# Patient Record
Sex: FEMALE | Race: BLACK OR AFRICAN AMERICAN | Employment: OTHER | URBAN - METROPOLITAN AREA
[De-identification: names, ages, dates, MRNs, and addresses within clinical notes are randomized per-mention and may not be internally consistent; named-entity substitution may affect disease eponyms.]

---

## 2018-03-09 ENCOUNTER — OFFICE VISIT (OUTPATIENT)
Dept: SURGERY | Age: 63
End: 2018-03-09

## 2018-03-09 VITALS
HEIGHT: 63 IN | TEMPERATURE: 97.7 F | RESPIRATION RATE: 20 BRPM | SYSTOLIC BLOOD PRESSURE: 141 MMHG | BODY MASS INDEX: 46.78 KG/M2 | HEART RATE: 96 BPM | DIASTOLIC BLOOD PRESSURE: 79 MMHG | WEIGHT: 264 LBS

## 2018-03-09 DIAGNOSIS — K21.9 GASTROESOPHAGEAL REFLUX DISEASE WITHOUT ESOPHAGITIS: ICD-10-CM

## 2018-03-09 DIAGNOSIS — I10 ESSENTIAL HYPERTENSION: ICD-10-CM

## 2018-03-09 DIAGNOSIS — E66.01 MORBID OBESITY (HCC): Primary | ICD-10-CM

## 2018-03-09 DIAGNOSIS — N39.3 SUI (STRESS URINARY INCONTINENCE, FEMALE): ICD-10-CM

## 2018-03-09 PROBLEM — E11.9 TYPE 2 DIABETES MELLITUS WITHOUT COMPLICATION, WITHOUT LONG-TERM CURRENT USE OF INSULIN (HCC): Status: ACTIVE | Noted: 2018-03-09

## 2018-03-09 RX ORDER — MELOXICAM 15 MG/1
15 TABLET ORAL DAILY
Status: ON HOLD | COMMUNITY
End: 2018-07-26

## 2018-03-09 RX ORDER — RANITIDINE 300 MG/1
300 CAPSULE ORAL DAILY
COMMUNITY
End: 2018-07-26

## 2018-03-09 RX ORDER — DULOXETIN HYDROCHLORIDE 60 MG/1
60 CAPSULE, DELAYED RELEASE ORAL DAILY
COMMUNITY
End: 2018-09-19 | Stop reason: SDUPTHER

## 2018-03-09 NOTE — PROGRESS NOTES
Initial Consultation for Bariatric Surgery Template (Gastric Bypass)    Valentina Manning is a 65 y.o. female who comes into the office today for initial consultation for the surgical options for the treatment of morbid obesity. The patient initially identified obesity at the age of 49 and at age 26 weighed 125lbs. She has never tried a weight-loss attempt. Today, the patient is  Height: 5' 3\" (160 cm) tall, Weight: 119.7 kg (264 lb) lbs for a Body mass index is 46.77 kg/(m^2). It is due to the patient's severe obesity, which is further complicated by adult onset diabetes mellitus, hypertension, obstructive sleep apnea - CPAP and weight related arthopathies  that the patient is now seeking out bariatric surgery, specifically, gastric bypass. Past Medical History:   Diagnosis Date    Carpal tunnel syndrome     Colon polyp     Depression     Diabetes (HCC)     Fibromyalgia     Hiatal hernia     Hypertension     Irregular heart rate     ARRYTHMIA    Osteoarthritis (arthritis due to wear and tear of joints)     PTSD (post-traumatic stress disorder)     Sleep apnea     Spinal stenosis        Past Surgical History:   Procedure Laterality Date    COLONOSCOPY N/A 2/21/2017    COLONOSCOPY performed by Libia Bowens MD at [address] [place] [address] HX ABDOMINAL LAPAROSCOPY      CYST/tumors    HX APPENDECTOMY  1997    HX HYSTERECTOMY  1992    total    HX SALPINGO-OOPHORECTOMY  1997    BILATERAL       Current Outpatient Prescriptions   Medication Sig Dispense Refill    atorvastatin (LIPITOR) 40 mg tablet Take 40 mg by mouth nightly.  diclofenac EC (VOLTAREN) 75 mg EC tablet Take 75 mg by mouth two (2) times a day.  metoprolol (LOPRESSOR) 50 mg tablet Take  by mouth two (2) times a day.  sitaGLIPtin (JANUVIA) 100 mg tablet Take 100 mg by mouth nightly.  traMADol (ULTRAM) 50 mg tablet Take 1 Tab by mouth every six (6) hours as needed for Pain.  10 Tab 0    sertraline (ZOLOFT) 50 mg tablet Take  by mouth daily.          Allergies   Allergen Reactions    Aspirin Shortness of Breath    Demerol [Meperidine] Shortness of Breath    Doxycycline Shortness of Breath    Morphine Shortness of Breath    Neurontin [Gabapentin] Unable to Obtain    Nortriptyline Other (comments)     hallucinations    Tetracyclines Shortness of Breath    Tylenol [Acetaminophen] Shortness of Breath       Social History   Substance Use Topics    Smoking status: Never Smoker    Smokeless tobacco: Never Used    Alcohol use None       Family History   Problem Relation Age of Onset    Ovarian Cancer Mother 64    Ovarian Cancer Maternal Grandmother     Cancer Father      Prostate Ca       Family Status   Relation Status    Mother     Maternal Grandmother     Father     Sister Alive    X3    Brother Alive    X3    Brother    Akil Thompson Sister     Maternal Grandfather     Paternal Grandfather     Paternal Grandmother        Review of Systems:  Positive in BOLD    CONST: Fever, weight loss, fatigue or chills  GI: Nausea, vomiting, abdominal pain, change in bowel habits, hematochezia, melena, and GERD   INTEG: Dermatitis, abnormal moles  HEENT: Recent changes in vision, vertigo, epistaxis, dysphagia and hoarseness  CV: Chest pain, palpitations, HTN, edema and varicosities  RESP: Cough, shortness of breath, wheezing, hemoptysis, snoring and reactive airway disease, she does not wear CPAP -   : Hematuria, dysuria, frequency, urgency, nocturia and stress urinary incontinence   MS: Weakness, joint pain and arthritis  ENDO: Diabetes, thyroid disease, polyuria, polydipsia, polyphagia, poor wound healing, heat intolerance, cold intolerance  LYMPH/HEME: Anemia, bruising and history of blood transfusions  NEURO: Dizziness, headache, fainting, seizures and stroke  PSYCH: Anxiety and depression      Physical Exam    Visit Vitals    /79 (BP 1 Location: Left arm, BP Patient Position: At rest)    Pulse 96    Temp 97.7 °F (36.5 °C) (Oral)    Resp 20    Ht 5' 3\" (1.6 m)    Wt 119.7 kg (264 lb)    BMI 46.77 kg/m2       Pre op weight: 264  EBW: 133  Wt loss to date: 0       General: 65 y.o.) female in no acute distress. Morbidly obese in abdomen - mixed pattern  HEENT: Normocephalic, atraumatic, Pupils equal and reactive, nasopharynx clear, oropharynx clear and moist without lesions  NECK: Supple, no lymphadenopathy, thyromegaly, carotid bruits or jugular venous distension. trachea midline  RESP: Clear to auscultation bilaterally, no wheezes, rhonchi, or rales, normal respiratory excursion  CV: Regular rate and rhythm, no murmurs, rubs or gallops. 3+/4 pulses in bilateral dorsalis pedis and posterior tibialis. No distal edema or varicosities. ABD: Soft, nontender, nondistended, normoactive bowel sounds, no hernias, no hepatosplenomegaly, easily palpable costal margins, mixed distribution, umbilical hernia, healed lower midline scar. Extremities: Warm, well perfused, no tenderness or swelling, normal gait/station  Neuro: Sensation and strength grossly intact and symmetrical  Psych: Alert and oriented to person, place, and time. Impression:    Everardo Olivares is a 65 y.o. female who is suffering from morbid obesity with a BMI of 47  and comorbidities including adult onset diabetes mellitus, hypertension, obstructive sleep apnea - CPAP and weight related arthopathies  who would benefit from bariatric surgery. We have had an extensive discussion with regard to the risks, benefits and likely outcomes of the operation. We've discussed the restrictive and malabsorptive nature of the gastric bypass and compared and contrasted with the sleeve gastrectomy. The patient understands the likelihood of losing approximately 80% of their excess weight in 12 to 18 months.  She also understands the risks including but not limited to bleeding, infection, need for reoperation, ulcers, leaks and strictures, bowel obstruction secondary to adhesions and internal hernias, DVT, PE, heart attack, stroke, and death. Patient also understands risks of inadequate weight loss, excess weight loss, vitamin insufficiency, protein malnutrition, excess skin, and loss of hair. We have reviewed the components of a successful postoperative course including requirement for a high protein, low carbohydrate diet, 60 oz a day of zero calorie liquids, daily vitamin supplementation, daily exercise, regular follow-up, and participation in support groups. At this time we will enroll the patient in our bariatric program, undertake routine laboratory evaluation, chest X-ray, EKG, possible UGI and evaluation by  nutritionist as well as psychologist and pending their satisfactory completion of the preop evaluation, plan to perform a sleeve assuming she does not have a large hiatal hernia. This is because she cannot tolerate Tylenol or most narcotics and she has chronic pain. As a result she uses NSAIDs to control her pain often. If she has a large HH, she will need to return to discuss options further. She must also wear CPAP in preparation for her surgery.

## 2018-03-09 NOTE — PROGRESS NOTES
Juan Cherry is a 65 y.o. female who presents today with   Chief Complaint   Patient presents with    Morbid Obesity     Consult            Body mass index is 46.77 kg/(m^2). 1. Have you been to the ER, urgent care clinic since your last visit? Hospitalized since your last visit? No    2. Have you seen or consulted any other health care providers outside of the [address] [place] [address] since your last visit? Include any pap smears or colon screening.  No

## 2018-03-09 NOTE — LETTER
3/9/2018 1:44 PM 
 
Patient:  Eb Banda YOB: 1955 Date of Visit: 3/9/2018 Casey Feliz MD 
[address] [address] VIA Facsimile: [phone] Dear Casey Feliz MD, Thank you for referring Ms. Porsha Mcmahon to [address] [address] for evaluation and treatment. Below are the relevant portions of my assessment and plan of care. Initial Consultation for Bariatric Surgery Template (Gastric Bypass) Eb Banda is a 65 y.o. female who comes into the office today for initial consultation for the surgical options for the treatment of morbid obesity. The patient initially identified obesity at the age of 49 and at age 26 weighed 125lbs. She has never tried a weight-loss attempt. Today, the patient is  Height: 5' 3\" (160 cm) tall, Weight: 119.7 kg (264 lb) lbs for a Body mass index is 46.77 kg/(m^2). It is due to the patient's severe obesity, which is further complicated by adult onset diabetes mellitus, hypertension, obstructive sleep apnea - CPAP and weight related arthopathies  that the patient is now seeking out bariatric surgery, specifically, gastric bypass. Past Medical History:  
Diagnosis Date  Carpal tunnel syndrome  Colon polyp  Depression  Diabetes ([address]  Fibromyalgia  Hiatal hernia  Hypertension  Irregular heart rate ARRYTHMIA  Osteoarthritis (arthritis due to wear and tear of joints)  PTSD (post-traumatic stress disorder)  Sleep apnea  Spinal stenosis Past Surgical History:  
Procedure Laterality Date  COLONOSCOPY N/A 2/21/2017 COLONOSCOPY performed by Elizabet Zhang MD at [address] [place] [address] HX ABDOMINAL LAPAROSCOPY    
 CYST/tumors [address] [place] [address]  HX HYSTERECTOMY  1992  
 total  
 HX SALPINGO-OOPHORECTOMY  1997 BILATERAL Current Outpatient Prescriptions Medication Sig Dispense Refill  atorvastatin (LIPITOR) 40 mg tablet Take 40 mg by mouth nightly.  diclofenac EC (VOLTAREN) 75 mg EC tablet Take 75 mg by mouth two (2) times a day.  metoprolol (LOPRESSOR) 50 mg tablet Take  by mouth two (2) times a day.  sitaGLIPtin (JANUVIA) 100 mg tablet Take 100 mg by mouth nightly.  traMADol (ULTRAM) 50 mg tablet Take 1 Tab by mouth every six (6) hours as needed for Pain. 10 Tab 0  
 sertraline (ZOLOFT) 50 mg tablet Take  by mouth daily. Allergies Allergen Reactions  Aspirin Shortness of Breath  Demerol [Meperidine] Shortness of Breath  Doxycycline Shortness of Breath  Morphine Shortness of Breath  Neurontin [Gabapentin] Unable to [employer]  Nortriptyline Other (comments)  
  hallucinations  Tetracyclines Shortness of Breath  Tylenol [Acetaminophen] Shortness of Breath Social History Substance Use Topics  Smoking status: Never Smoker  Smokeless tobacco: Never Used  Alcohol use None Family History Problem Relation Age of Onset  Ovarian Cancer Mother 64  
 Ovarian Cancer Maternal Grandmother  Cancer Father Prostate Ca Family Status Relation Status  Mother   Maternal Grandmother   Father   Sister Alive X3  Brother Alive X3  Brother   Sister   Maternal Grandfather   Paternal Grandfather   Paternal Grandmother  Review of Systems:  Positive in BOLD 
 
CONST: Fever, weight loss, fatigue or chills GI: Nausea, vomiting, abdominal pain, change in bowel habits, hematochezia, melena, and GERD INTEG: Dermatitis, abnormal moles HEENT: Recent changes in vision, vertigo, epistaxis, dysphagia and hoarseness CV: Chest pain, palpitations, HTN, edema and varicosities RESP: Cough, shortness of breath, wheezing, hemoptysis, snoring and reactive airway disease, she does not wear CPAP -  
 : Hematuria, dysuria, frequency, urgency, nocturia and stress urinary incontinence MS: Weakness, joint pain and arthritis ENDO: Diabetes, thyroid disease, polyuria, polydipsia, polyphagia, poor wound healing, heat intolerance, cold intolerance LYMPH/HEME: Anemia, bruising and history of blood transfusions NEURO: Dizziness, headache, fainting, seizures and stroke PSYCH: Anxiety and depression Physical Exam 
 
Visit Vitals  /79 (BP 1 Location: Left arm, BP Patient Position: At rest)  Pulse 96  Temp 97.7 °F (36.5 °C) (Oral)  Resp 20  
 Ht 5' 3\" (1.6 m)  Wt 119.7 kg (264 lb)  BMI 46.77 kg/m2 Pre op weight: 264 EBW: 133 Wt loss to date: 0 General: 65 y.o.) female in no acute distress. Morbidly obese in abdomen - mixed pattern HEENT: Normocephalic, atraumatic, Pupils equal and reactive, nasopharynx clear, oropharynx clear and moist without lesions NECK: Supple, no lymphadenopathy, thyromegaly, carotid bruits or jugular venous distension. trachea midline RESP: Clear to auscultation bilaterally, no wheezes, rhonchi, or rales, normal respiratory excursion CV: Regular rate and rhythm, no murmurs, rubs or gallops. 3+/4 pulses in bilateral dorsalis pedis and posterior tibialis. No distal edema or varicosities. ABD: Soft, nontender, nondistended, normoactive bowel sounds, no hernias, no hepatosplenomegaly, easily palpable costal margins, mixed distribution, umbilical hernia, healed lower midline scar. Extremities: Warm, well perfused, no tenderness or swelling, normal gait/station Neuro: Sensation and strength grossly intact and symmetrical 
Psych: Alert and oriented to person, place, and time.  
 
Impression: 
 
Skylar Valencia is a 65 y.o. female who is suffering from morbid obesity with a BMI of 47  and comorbidities including adult onset diabetes mellitus, hypertension, obstructive sleep apnea - CPAP and weight related arthopathies  who would benefit from bariatric surgery. We have had an extensive discussion with regard to the risks, benefits and likely outcomes of the operation. We've discussed the restrictive and malabsorptive nature of the gastric bypass and compared and contrasted with the sleeve gastrectomy. The patient understands the likelihood of losing approximately 80% of their excess weight in 12 to 18 months. She also understands the risks including but not limited to bleeding, infection, need for reoperation, ulcers, leaks and strictures, bowel obstruction secondary to adhesions and internal hernias, DVT, PE, heart attack, stroke, and death. Patient also understands risks of inadequate weight loss, excess weight loss, vitamin insufficiency, protein malnutrition, excess skin, and loss of hair. We have reviewed the components of a successful postoperative course including requirement for a high protein, low carbohydrate diet, 60 oz a day of zero calorie liquids, daily vitamin supplementation, daily exercise, regular follow-up, and participation in support groups. At this time we will enroll the patient in our bariatric program, undertake routine laboratory evaluation, chest X-ray, EKG, possible UGI and evaluation by  nutritionist as well as psychologist and pending their satisfactory completion of the preop evaluation, plan to perform a sleeve assuming she does not have a large hiatal hernia. This is because she cannot tolerate Tylenol or most narcotics and she has chronic pain. As a result she uses NSAIDs to control her pain often. If she has a large HH, she will need to return to discuss options further. She must also wear CPAP in preparation for her surgery. Thank you very much for your referral of Ms. Khalida Jones. If you have questions, please do not hesitate to call me. I look forward to following MsRadha Anisha along with you and will keep you updated as to her progress.   
 
 
 
 
Sincerely, 
 
 
 Sheila Dean MD

## 2018-03-09 NOTE — MR AVS SNAPSHOT
[address] [place] [address] [address] [address] 
[phone] Patient: Osvaldo Tran MRN: [id number] Visit Information Date & Time Provider Department Dept. Phone Encounter #  
 3/9/2018  1:00 PM MD [facility] [phone] [id number] Your Appointments 4/12/2018 10:30 AM  
NUTRITION COUNSELING with [facility] DIETICIAN [address] ([place]) Appt Note: 1 of 6 LGBP  
 [address] [place] [address] [address]  
  
   
 [address] [place] [address] [address] [facility] 5/10/2018 10:30 AM  
NUTRITION COUNSELING with [facility] DIETICIAN [address] ([place]) Appt Note: 2 of 6 BP  
 [address] [place] [address] [address] [address]  
[phone] 6/14/2018 10:30 AM  
NUTRITION COUNSELING with [facility] DIETICIAN [address] ([place]) Appt Note: 3 of [address] [facility]  
[phone]  
  
    
 6/29/2018 10:45 AM  
Follow Up with Andreas Devries MD  
[address] [place]) Appt Note: mid trail apt  
 [address] [place] [address] [address] [facility] 7/12/2018 10:30 AM  
NUTRITION COUNSELING with [facility] DIETICIAN [address] ([place]) Appt Note: 4 of 6  
 [address] [place] [address] [address] [address]  
[phone]  
  
    
 8/16/2018 10:30 AM  
NUTRITION COUNSELING with [facility] DIETICIAN [address] ([place]) Appt Note: 5 of 6 [address] [address]  
[phone] 9/13/2018 10:30 AM  
NUTRITION COUNSELING with [facility] DIETICIAN [address] ([facility]) Appt Note: 6 of [address] [place] [address] [phone] Upcoming Health Maintenance Date Due Hepatitis C Screening 1955 DTaP/Tdap/Td series (1 - Tdap) 3/25/1977 PAP AKA CERVICAL CYTOLOGY 3/25/1977 FOBT Q 1 YEAR AGE 50-75 3/25/2006 ZOSTER VACCINE AGE 60> 1/25/2016 BREAST CANCER SCRN MAMMOGRAM 2/19/2017 Influenza Age 5 to Adult 8/1/2017 Allergies as of 3/9/2018  Review Complete On: 3/9/2018 By: Hoang Valadez Severity Noted Reaction Type Reactions Aspirin  07/28/2014    Shortness of Breath Demerol [Meperidine]  07/28/2014    Shortness of Breath Doxycycline  07/28/2014    Shortness of Breath Morphine  07/28/2014    Shortness of Breath Neurontin [Gabapentin]  07/28/2014    Unable to Obtain Nortriptyline  01/14/2016    Other (comments)  
 hallucinations Tetracyclines  07/28/2014    Shortness of Breath Tylenol [Acetaminophen]  07/28/2014    Shortness of Breath Current Immunizations  Never Reviewed No immunizations on file. Not reviewed this visit You Were Diagnosed With   
  
 Codes Comments Morbid obesity ([facility] [address]    -  Primary ICD-10-CM: E66.01 
ICD-9-CM: 278.01 Body mass index (BMI) of 40.0-44.9 in adult [facility])     ICD-10-CM: Z68.41 
ICD-9-CM: V85.41 Essential hypertension     ICD-10-CM: I10 
ICD-9-CM: 401.9 Gastroesophageal reflux disease without esophagitis     ICD-10-CM: K21.9 ICD-9-CM: 530.81 DONTE (stress urinary incontinence, female)     ICD-10-CM: N39.3 ICD-9-CM: 625.6 Vitals BP Pulse Temp Resp Height(growth percentile) Weight(growth percentile) 141/79 (BP 1 Location: Left arm, BP Patient Position: At rest) 96 97.7 °F (36.5 °C) (Oral) 20 5' 3\" (1.6 m) 264 lb (119.7 kg) BMI OB Status Smoking Status 46.77 kg/m2 Hysterectomy Never Smoker Vitals History BMI and BSA Data Body Mass Index Body Surface Area  
 46.77 kg/m 2 2.31 m 2 Your Updated Medication List  
  
   
This list is accurate as of 3/9/18  2:00 PM.  Always use your most recent med list.  
  
  
  
  
 atorvastatin 40 mg tablet Commonly known as:  LIPITOR Take 40 mg by mouth nightly. diclofenac EC 75 mg EC tablet Commonly known as:  VOLTAREN Take 75 mg by mouth two (2) times a day. DULoxetine 60 mg capsule Commonly known as:  CYMBALTA Take 60 mg by mouth daily. meloxicam 15 mg tablet Commonly known as:  MOBIC Take 15 mg by mouth daily. metoprolol tartrate 50 mg tablet Commonly known as:  LOPRESSOR Take  by mouth two (2) times a day. raNITIdine hcl 300 mg Cap Take 300 Caps by mouth daily. Indications: Heartburn SITagliptin 100 mg tablet Commonly known as:  [place] Take 100 mg by mouth nightly. traMADol 50 mg tablet Commonly known as:  ULTRAM  
Take 1 Tab by mouth every six (6) hours as needed for Pain. To-Do List   
 03/09/2018 Lab:  CBC WITH AUTOMATED DIFF   
  
 03/09/2018 ECG:  EKG, 12 LEAD, INITIAL   
  
 03/09/2018 Lab:  FERRITIN   
  
 03/09/2018 Lab:  HEMOGLOBIN A1C WITH EAG   
  
 03/09/2018 Lab:  IRON   
  
 03/09/2018 Lab:  LIPID PANEL   
  
 03/09/2018 Lab:  METABOLIC PANEL, COMPREHENSIVE   
  
 03/09/2018 Lab:  TSH 3RD GENERATION   
  
 03/09/2018 Lab:  VITAMIN B1, WHOLE BLOOD   
  
 03/09/2018 Lab:  VITAMIN B12 & FOLATE   
  
 03/09/2018 Lab:  VITAMIN D, 25 HYDROXY   
  
 03/09/2018 Imaging:  XR UPPER GI SERIES W KUB Introducing [facility]! [facility] introduces [employer] patient portal. Now you can access parts of your medical record, email your doctor's office, and request medication refills online.    
 
1. In your internet browser, go to [email or web address] 2. Click on the First Time User? Click Here link in the Sign In box. You will see the New Member Sign Up page. 3. Enter your Mapbar Access Code exactly as it appears below. You will not need to use this code after youve completed the sign-up process. If you do not sign up before the expiration date, you must request a new code. · Mapbar Access Code: [email or web address] Expires: 6/7/2018 12:46 PM 
 
4. Enter the last four digits of your Social Security Number (xxxx) and Date of Birth (mm/dd/yyyy) as indicated and click Submit. You will be taken to the next sign-up page. 5. Create a [employer] ID. This will be your Mapbar login ID and cannot be changed, so think of one that is secure and easy to remember. 6. Create a Mapbar password. You can change your password at any time. 7. Enter your Password Reset Question and Answer. This can be used at a later time if you forget your password. 8. Enter your e-mail address. You will receive e-mail notification when new information is available in [address] 9. Click Sign Up. You can now view and download portions of your medical record. 10. Click the Download Summary menu link to download a portable copy of your medical information. If you have questions, please visit the Frequently Asked Questions section of the Mapbar website. Remember, Mapbar is NOT to be used for urgent needs. For medical emergencies, dial 911. Now available from your iPhone and Android! Please provide this summary of care documentation to your next provider. Your primary care clinician is listed as Melanie Moscoso. If you have any questions after today's visit, please call [phone].

## 2018-03-09 NOTE — COMMUNICATION BODY
Initial Consultation for Bariatric Surgery Template (Gastric Bypass)    Juan Cherry is a 65 y.o. female who comes into the office today for initial consultation for the surgical options for the treatment of morbid obesity. The patient initially identified obesity at the age of 49 and at age 26 weighed 125lbs. She has never tried a weight-loss attempt. Today, the patient is  Height: 5' 3\" (160 cm) tall, Weight: 119.7 kg (264 lb) lbs for a Body mass index is 46.77 kg/(m^2). It is due to the patient's severe obesity, which is further complicated by adult onset diabetes mellitus, hypertension, obstructive sleep apnea - CPAP and weight related arthopathies  that the patient is now seeking out bariatric surgery, specifically, gastric bypass. Past Medical History:   Diagnosis Date    Carpal tunnel syndrome     Colon polyp     Depression     Diabetes (HCC)     Fibromyalgia     Hiatal hernia     Hypertension     Irregular heart rate     ARRYTHMIA    Osteoarthritis (arthritis due to wear and tear of joints)     PTSD (post-traumatic stress disorder)     Sleep apnea     Spinal stenosis        Past Surgical History:   Procedure Laterality Date    COLONOSCOPY N/A 2/21/2017    COLONOSCOPY performed by Davon Salomon MD at [address] [place] [address] HX ABDOMINAL LAPAROSCOPY      CYST/tumors    HX APPENDECTOMY  1997    HX HYSTERECTOMY  1992    total    HX SALPINGO-OOPHORECTOMY  1997    BILATERAL       Current Outpatient Prescriptions   Medication Sig Dispense Refill    atorvastatin (LIPITOR) 40 mg tablet Take 40 mg by mouth nightly.  diclofenac EC (VOLTAREN) 75 mg EC tablet Take 75 mg by mouth two (2) times a day.  metoprolol (LOPRESSOR) 50 mg tablet Take  by mouth two (2) times a day.  sitaGLIPtin (JANUVIA) 100 mg tablet Take 100 mg by mouth nightly.  traMADol (ULTRAM) 50 mg tablet Take 1 Tab by mouth every six (6) hours as needed for Pain.  10 Tab 0    sertraline (ZOLOFT) 50 mg tablet Take  by mouth daily.          Allergies   Allergen Reactions    Aspirin Shortness of Breath    Demerol [Meperidine] Shortness of Breath    Doxycycline Shortness of Breath    Morphine Shortness of Breath    Neurontin [Gabapentin] Unable to Obtain    Nortriptyline Other (comments)     hallucinations    Tetracyclines Shortness of Breath    Tylenol [Acetaminophen] Shortness of Breath       Social History   Substance Use Topics    Smoking status: Never Smoker    Smokeless tobacco: Never Used    Alcohol use None       Family History   Problem Relation Age of Onset    Ovarian Cancer Mother 64    Ovarian Cancer Maternal Grandmother     Cancer Father      Prostate Ca       Family Status   Relation Status    Mother     Maternal Grandmother     Father     Sister Alive    X3    Brother Alive    X3    Brother    [facility] Sister     Maternal Grandfather     Paternal Grandfather     Paternal Grandmother        Review of Systems:  Positive in BOLD    CONST: Fever, weight loss, fatigue or chills  GI: Nausea, vomiting, abdominal pain, change in bowel habits, hematochezia, melena, and GERD   INTEG: Dermatitis, abnormal moles  HEENT: Recent changes in vision, vertigo, epistaxis, dysphagia and hoarseness  CV: Chest pain, palpitations, HTN, edema and varicosities  RESP: Cough, shortness of breath, wheezing, hemoptysis, snoring and reactive airway disease, she does not wear CPAP -   : Hematuria, dysuria, frequency, urgency, nocturia and stress urinary incontinence   MS: Weakness, joint pain and arthritis  ENDO: Diabetes, thyroid disease, polyuria, polydipsia, polyphagia, poor wound healing, heat intolerance, cold intolerance  LYMPH/HEME: Anemia, bruising and history of blood transfusions  NEURO: Dizziness, headache, fainting, seizures and stroke  PSYCH: Anxiety and depression      Physical Exam    Visit Vitals    /79 (BP 1 Location: Left arm, BP Patient Position: At rest)    Pulse 96    Temp 97.7 °F (36.5 °C) (Oral)    Resp 20    Ht 5' 3\" (1.6 m)    Wt 119.7 kg (264 lb)    BMI 46.77 kg/m2       Pre op weight: 264  EBW: 133  Wt loss to date: 0       General: 65 y.o.) female in no acute distress. Morbidly obese in abdomen - mixed pattern  HEENT: Normocephalic, atraumatic, Pupils equal and reactive, nasopharynx clear, oropharynx clear and moist without lesions  NECK: Supple, no lymphadenopathy, thyromegaly, carotid bruits or jugular venous distension. trachea midline  RESP: Clear to auscultation bilaterally, no wheezes, rhonchi, or rales, normal respiratory excursion  CV: Regular rate and rhythm, no murmurs, rubs or gallops. 3+/4 pulses in bilateral dorsalis pedis and posterior tibialis. No distal edema or varicosities. ABD: Soft, nontender, nondistended, normoactive bowel sounds, no hernias, no hepatosplenomegaly, easily palpable costal margins, mixed distribution, umbilical hernia, healed lower midline scar. Extremities: Warm, well perfused, no tenderness or swelling, normal gait/station  Neuro: Sensation and strength grossly intact and symmetrical  Psych: Alert and oriented to person, place, and time. Impression:    Reina Barber is a 65 y.o. female who is suffering from morbid obesity with a BMI of 47  and comorbidities including adult onset diabetes mellitus, hypertension, obstructive sleep apnea - CPAP and weight related arthopathies  who would benefit from bariatric surgery. We have had an extensive discussion with regard to the risks, benefits and likely outcomes of the operation. We've discussed the restrictive and malabsorptive nature of the gastric bypass and compared and contrasted with the sleeve gastrectomy. The patient understands the likelihood of losing approximately 80% of their excess weight in 12 to 18 months.  She also understands the risks including but not limited to bleeding, infection, need for reoperation, ulcers, leaks and strictures, bowel obstruction secondary to adhesions and internal hernias, DVT, PE, heart attack, stroke, and death. Patient also understands risks of inadequate weight loss, excess weight loss, vitamin insufficiency, protein malnutrition, excess skin, and loss of hair. We have reviewed the components of a successful postoperative course including requirement for a high protein, low carbohydrate diet, 60 oz a day of zero calorie liquids, daily vitamin supplementation, daily exercise, regular follow-up, and participation in support groups. At this time we will enroll the patient in our bariatric program, undertake routine laboratory evaluation, chest X-ray, EKG, possible UGI and evaluation by  nutritionist as well as psychologist and pending their satisfactory completion of the preop evaluation, plan to perform a sleeve assuming she does not have a large hiatal hernia. This is because she cannot tolerate Tylenol or most narcotics and she has chronic pain. As a result she uses NSAIDs to control her pain often. If she has a large HH, she will need to return to discuss options further. She must also wear CPAP in preparation for her surgery.

## 2018-03-12 LAB
UREA BREATH TEST QL: NEGATIVE
UREA BREATH TEST QL: NORMAL

## 2018-04-30 ENCOUNTER — DOCUMENTATION ONLY (OUTPATIENT)
Dept: SURGERY | Age: 63
End: 2018-04-30

## 2018-04-30 NOTE — PROGRESS NOTES
[facility]  [address] [facility], [address] [place]    Patient's Name: Essence Lancaster                                  Age: 59 y.o. YOB: 1955                                          Sex: female  Date: 04/19/2018  Insurance: [employer]                         Session: 1 of 6               Surgeon:  Dr. Omer Wharton    Height: 5'3\"                    Weight: 255#           Starting weight with surgeon: 264#          Do you smoke?: no    Alcohol Intake:   I do not drink at all:x      Class Guidelines    Pt. Understand that if they miss a month, depending on insurance, they may have to start over. Pt. Also understand that the expectation is to lose  Or maintain weight. Weight gain may result in delaying surgery until the weight is off. EATING HABITS AND BEHAVIORS:  Pt. Struggles With:  Liquid calories:   Skipping breakfast:   Eating foods with a high amount of carbohydrates:   Eating High fat foods:   Eating large portions:   Making poor snack choices:  Eating out frequently:   Skipping meals: x  Reading labels:   Drinking >48 oz fluids daily:   Getting sufficient physical activity/exercise: x  Night time eating/snacking:   Binge eating:   Eating often, even when not hungry (grazing):   Giving into peer pressure regarding eating/drinking:   Other:     Each class we spend time discussing the pre-op diet, diet progression and vitamin/mineral requirements as well as the Key Diet Principles. Each class we also cover lowering carbohydrate consumption. Keeping carbohydrates <25 grams per meal and avoiding added sugars is emphasized. Patient is educated on the effects that  carbohydrates and bad fats have on them. PHYSICAL ACTIVITY/EXERCISE:   Patient's activity is increasing because patient plans to or is:  Walking more: Other aerobic activity such as running, swimming, Kay, kickboxing ect.:   Chair exercises:    Active in resistance training such as weight lifting:   Other:     Each class we spend time discussing the importance of increasing activity. Patient can start with 10 minutes of walking daily or chair exercises and build from there. BEHAVIOR MODIFICATION:  Making modifications to behavior, patient plans to or is:  Eating only when hungry not to treat stress, anger, tiredness or boredom: x  Eating away from TV, computer and phone:   Eating on a small plate:   Eats slowly, chewing food well: x  Other: We spend time in each class discussing the importance of breaking bad habits and how to do this. I encourage pt.s to self evaluate and look for those crucial moments in which they are making these poor choices. I recommend a food journal which can help identify when/where//why/who we are with when we compromise and make exceptions that are poor choices. ADDITIONAL INFORMATION:  Specific changes patient made since the last class:  Patient is starting the lifestyle changes.     Magdalena Mullins RD  04/19/2018

## 2018-05-18 ENCOUNTER — TELEPHONE (OUTPATIENT)
Dept: SURGERY | Age: 63
End: 2018-05-18

## 2018-05-18 NOTE — TELEPHONE ENCOUNTER
Called and spoke with patient she had missed her last wlt class. She is no longer interested in the program she has to many things going on in her life personally and this would not be a could fit for her. Tried to encourage the patient to continue to see nutritionist even if she is not sure she can still remain educated towards having better eating habits and if she changes her mind she could still go forward in the program as she would not have missed any classes. Patient still declined and told her if she changes her mind to give us a call back.

## 2018-07-25 ENCOUNTER — ANESTHESIA EVENT (OUTPATIENT)
Dept: ENDOSCOPY | Age: 63
End: 2018-07-25
Payer: MEDICARE

## 2018-07-26 ENCOUNTER — HOSPITAL ENCOUNTER (OUTPATIENT)
Age: 63
Setting detail: OUTPATIENT SURGERY
Discharge: HOME | End: 2018-07-26
Attending: INTERNAL MEDICINE | Admitting: INTERNAL MEDICINE
Payer: MEDICARE

## 2018-07-26 ENCOUNTER — ANESTHESIA (OUTPATIENT)
Dept: ENDOSCOPY | Age: 63
End: 2018-07-26
Payer: MEDICARE

## 2018-07-26 VITALS
HEART RATE: 79 BPM | BODY MASS INDEX: 43.12 KG/M2 | TEMPERATURE: 98 F | DIASTOLIC BLOOD PRESSURE: 79 MMHG | WEIGHT: 243.44 LBS | RESPIRATION RATE: 16 BRPM | OXYGEN SATURATION: 99 % | SYSTOLIC BLOOD PRESSURE: 136 MMHG

## 2018-07-26 LAB — GLUCOSE BLD STRIP.AUTO-MCNC: 139 MG/DL (ref 70–110)

## 2018-07-26 PROCEDURE — 82962 GLUCOSE BLOOD TEST: CPT

## 2018-07-26 PROCEDURE — 88342 IMHCHEM/IMCYTCHM 1ST ANTB: CPT | Performed by: INTERNAL MEDICINE

## 2018-07-26 PROCEDURE — 77030009426 HC FCPS BIOP ENDOSC BSC -B: Performed by: INTERNAL MEDICINE

## 2018-07-26 PROCEDURE — 74011250636 HC RX REV CODE- 250/636: Performed by: NURSE ANESTHETIST, CERTIFIED REGISTERED

## 2018-07-26 PROCEDURE — 76060000032 HC ANESTHESIA 0.5 TO 1 HR: Performed by: INTERNAL MEDICINE

## 2018-07-26 PROCEDURE — 74011250636 HC RX REV CODE- 250/636

## 2018-07-26 PROCEDURE — 76040000007: Performed by: INTERNAL MEDICINE

## 2018-07-26 PROCEDURE — 88305 TISSUE EXAM BY PATHOLOGIST: CPT | Performed by: INTERNAL MEDICINE

## 2018-07-26 PROCEDURE — 77030031670 HC DEV INFL ENDOTEK BIG60 MRTM -B: Performed by: INTERNAL MEDICINE

## 2018-07-26 RX ORDER — SODIUM CHLORIDE, SODIUM LACTATE, POTASSIUM CHLORIDE, CALCIUM CHLORIDE 600; 310; 30; 20 MG/100ML; MG/100ML; MG/100ML; MG/100ML
50 INJECTION, SOLUTION INTRAVENOUS CONTINUOUS
Status: DISCONTINUED | OUTPATIENT
Start: 2018-07-26 | End: 2018-07-26 | Stop reason: HOSPADM

## 2018-07-26 RX ORDER — SODIUM CHLORIDE 0.9 % (FLUSH) 0.9 %
5-10 SYRINGE (ML) INJECTION AS NEEDED
Status: DISCONTINUED | OUTPATIENT
Start: 2018-07-26 | End: 2018-07-26 | Stop reason: HOSPADM

## 2018-07-26 RX ORDER — PROPOFOL 10 MG/ML
INJECTION, EMULSION INTRAVENOUS AS NEEDED
Status: DISCONTINUED | OUTPATIENT
Start: 2018-07-26 | End: 2018-07-26 | Stop reason: HOSPADM

## 2018-07-26 RX ORDER — ERGOCALCIFEROL 1.25 MG/1
50000 CAPSULE ORAL
COMMUNITY
End: 2019-01-03

## 2018-07-26 RX ORDER — INSULIN LISPRO 100 [IU]/ML
INJECTION, SOLUTION INTRAVENOUS; SUBCUTANEOUS ONCE
Status: DISCONTINUED | OUTPATIENT
Start: 2018-07-26 | End: 2018-07-26 | Stop reason: HOSPADM

## 2018-07-26 RX ORDER — FAMOTIDINE 20 MG/1
20 TABLET, FILM COATED ORAL ONCE
Status: DISCONTINUED | OUTPATIENT
Start: 2018-07-26 | End: 2018-07-26 | Stop reason: HOSPADM

## 2018-07-26 RX ORDER — SODIUM CHLORIDE, SODIUM LACTATE, POTASSIUM CHLORIDE, CALCIUM CHLORIDE 600; 310; 30; 20 MG/100ML; MG/100ML; MG/100ML; MG/100ML
50 INJECTION, SOLUTION INTRAVENOUS CONTINUOUS
Status: DISCONTINUED | OUTPATIENT
Start: 2018-07-27 | End: 2018-07-26

## 2018-07-26 RX ORDER — SODIUM CHLORIDE 0.9 % (FLUSH) 0.9 %
5-10 SYRINGE (ML) INJECTION EVERY 8 HOURS
Status: DISCONTINUED | OUTPATIENT
Start: 2018-07-26 | End: 2018-07-26 | Stop reason: HOSPADM

## 2018-07-26 RX ORDER — OMEPRAZOLE 20 MG/1
20 CAPSULE, DELAYED RELEASE ORAL DAILY
Qty: 30 CAP | Refills: 5 | Status: SHIPPED | OUTPATIENT
Start: 2018-07-26 | End: 2018-08-25

## 2018-07-26 RX ADMIN — PROPOFOL 20 MG: 10 INJECTION, EMULSION INTRAVENOUS at 10:41

## 2018-07-26 RX ADMIN — PROPOFOL 20 MG: 10 INJECTION, EMULSION INTRAVENOUS at 10:43

## 2018-07-26 RX ADMIN — PROPOFOL 20 MG: 10 INJECTION, EMULSION INTRAVENOUS at 10:28

## 2018-07-26 RX ADMIN — PROPOFOL 20 MG: 10 INJECTION, EMULSION INTRAVENOUS at 10:36

## 2018-07-26 RX ADMIN — PROPOFOL 20 MG: 10 INJECTION, EMULSION INTRAVENOUS at 10:27

## 2018-07-26 RX ADMIN — PROPOFOL 20 MG: 10 INJECTION, EMULSION INTRAVENOUS at 10:40

## 2018-07-26 RX ADMIN — PROPOFOL 50 MG: 10 INJECTION, EMULSION INTRAVENOUS at 10:15

## 2018-07-26 RX ADMIN — PROPOFOL 20 MG: 10 INJECTION, EMULSION INTRAVENOUS at 10:31

## 2018-07-26 RX ADMIN — PROPOFOL 20 MG: 10 INJECTION, EMULSION INTRAVENOUS at 10:49

## 2018-07-26 RX ADMIN — PROPOFOL 20 MG: 10 INJECTION, EMULSION INTRAVENOUS at 10:18

## 2018-07-26 RX ADMIN — SODIUM CHLORIDE, SODIUM LACTATE, POTASSIUM CHLORIDE, AND CALCIUM CHLORIDE 50 ML/HR: 600; 310; 30; 20 INJECTION, SOLUTION INTRAVENOUS at 08:55

## 2018-07-26 RX ADMIN — PROPOFOL 20 MG: 10 INJECTION, EMULSION INTRAVENOUS at 10:16

## 2018-07-26 RX ADMIN — PROPOFOL 20 MG: 10 INJECTION, EMULSION INTRAVENOUS at 10:30

## 2018-07-26 RX ADMIN — PROPOFOL 20 MG: 10 INJECTION, EMULSION INTRAVENOUS at 10:25

## 2018-07-26 RX ADMIN — PROPOFOL 20 MG: 10 INJECTION, EMULSION INTRAVENOUS at 10:38

## 2018-07-26 RX ADMIN — SODIUM CHLORIDE, SODIUM LACTATE, POTASSIUM CHLORIDE, AND CALCIUM CHLORIDE: 600; 310; 30; 20 INJECTION, SOLUTION INTRAVENOUS at 10:11

## 2018-07-26 RX ADMIN — PROPOFOL 20 MG: 10 INJECTION, EMULSION INTRAVENOUS at 10:32

## 2018-07-26 RX ADMIN — PROPOFOL 20 MG: 10 INJECTION, EMULSION INTRAVENOUS at 10:22

## 2018-07-26 RX ADMIN — PROPOFOL 10 MG: 10 INJECTION, EMULSION INTRAVENOUS at 10:21

## 2018-07-26 RX ADMIN — PROPOFOL 20 MG: 10 INJECTION, EMULSION INTRAVENOUS at 10:26

## 2018-07-26 RX ADMIN — PROPOFOL 20 MG: 10 INJECTION, EMULSION INTRAVENOUS at 10:34

## 2018-07-26 RX ADMIN — PROPOFOL 20 MG: 10 INJECTION, EMULSION INTRAVENOUS at 10:20

## 2018-07-26 RX ADMIN — PROPOFOL 20 MG: 10 INJECTION, EMULSION INTRAVENOUS at 10:23

## 2018-07-26 RX ADMIN — PROPOFOL 20 MG: 10 INJECTION, EMULSION INTRAVENOUS at 10:45

## 2018-07-26 NOTE — ANESTHESIA PREPROCEDURE EVALUATION
Anesthetic History   No history of anesthetic complications            Review of Systems / Medical History  Patient summary reviewed and pertinent labs reviewed    Pulmonary        Sleep apnea: CPAP           Neuro/Psych   Within defined limits           Cardiovascular    Hypertension: well controlled        Dysrhythmias : SVT      Exercise tolerance: >4 METS     GI/Hepatic/Renal  Within defined limits              Endo/Other    Diabetes: type 2    Morbid obesity     Other Findings   Comments: Documentation of current medication  Current medications obtained, documented and obtained? YES      Risk Factors for Postoperative nausea/vomiting:       History of postoperative nausea/vomiting? NO       Female? YES       Motion sickness? NO       Intended opioid administration for postoperative analgesia? NO      Smoking Abstinence:  Current Smoker? NO  Elective Surgery? YES  Seen preoperatively by anesthesiologist or proxy prior to day of surgery? YES  Pt abstained from smoking 24 hours prior to anesthesia?  N/A    Preventive care/screening for High Blood Pressure:  Aged 18 years and older: YES  Screened for high blood pressure: YES  Patients with high blood pressure referred to primary care provider   for BP management: YES                 Physical Exam    Airway  Mallampati: II  TM Distance: 4 - 6 cm  Neck ROM: normal range of motion   Mouth opening: Normal     Cardiovascular  Regular rate and rhythm,  S1 and S2 normal,  no murmur, click, rub, or gallop  Rhythm: regular  Rate: normal         Dental  No notable dental hx       Pulmonary  Breath sounds clear to auscultation               Abdominal  GI exam deferred       Other Findings            Anesthetic Plan    ASA: 3  Anesthesia type: MAC          Induction: Intravenous  Anesthetic plan and risks discussed with: Patient

## 2018-07-26 NOTE — ANESTHESIA POSTPROCEDURE EVALUATION
Post-Anesthesia Evaluation and Assessment    Patient: Magalys Bojorquez MRN: [id number]  SSN: [id number]    YOB: 1955  Age: 59 y.o. Sex: female       Cardiovascular Function/Vital Signs  Visit Vitals    /79    Pulse 79    Temp 36.7 °C (98 °F)    Resp 16    Wt 110.4 kg (243 lb 7 oz)    SpO2 99%    Breastfeeding No    BMI 43.12 kg/m2       Patient is status post MAC anesthesia for Procedure(s):  ESOPHAGOGASTRODUODENOSCOPY (EGD)  COLONOSCOPY. Nausea/Vomiting: None    Postoperative hydration reviewed and adequate. Pain:  Pain Scale 1: Numeric (0 - 10) (07/26/18 1059)  Pain Intensity 1: 0 (07/26/18 1059)   Managed    Neurological Status: At baseline    Mental Status and Level of Consciousness: Alert and oriented     Pulmonary Status:   O2 Device: Room air (07/26/18 1055)   Adequate oxygenation and airway patent    Complications related to anesthesia: None    Post-anesthesia assessment completed.  No concerns    Signed By: Venkata Garcia CRNA     July 26, 2018

## 2018-07-26 NOTE — IP AVS SNAPSHOT
[address] [address] Wen Mesavivian Mccarty 
[phone] Patient: Julia Martinez MRN: [id number] IVP:3/61/1766 About your hospitalization You were admitted on:  July 26, 2018 You last received care in the:  [facility] PHASE 2 RECOVERY You were discharged on:  July 26, 2018 Why you were hospitalized Your primary diagnosis was:  Not on File Follow-up Information Follow up With Details Comments Contact Info Dee Dee Denton MD   [address] [place] [address] [address] 
[phone] Lg Sue MD In 3 months  [facility] [address] [address] [phone] Discharge Orders None A check liz indicates which time of day the medication should be taken. My Medications START taking these medications Instructions Each Dose to Equal  
 Morning Noon Evening Bedtime  
 omeprazole 20 mg capsule Commonly known as:  PRILOSEC Your last dose was: Your next dose is: Take 1 Cap by mouth daily for 30 days. Indications: gastroesophageal reflux disease 20 mg CONTINUE taking these medications Instructions Each Dose to Equal  
 Morning Noon Evening Bedtime  
 atorvastatin 40 mg tablet Commonly known as:  LIPITOR Your last dose was: Your next dose is: Take 40 mg by mouth nightly. 40 mg  
    
   
   
   
  
 diclofenac EC 75 mg EC tablet Commonly known as:  VOLTAREN Your last dose was: Your next dose is: Take 75 mg by mouth two (2) times a day. 75 mg DULoxetine 60 mg capsule Commonly known as:  CYMBALTA Your last dose was: Your next dose is: Take 60 mg by mouth daily. 60 mg  
    
   
   
   
  
 metoprolol tartrate 50 mg tablet Commonly known as:  LOPRESSOR Your last dose was: Your next dose is: Take  by mouth two (2) times a day. SITagliptin 100 mg tablet Commonly known as:  Carie Fake Your last dose was: Your next dose is: Take 100 mg by mouth nightly. 100 mg  
    
   
   
   
  
 VITAMIN D2 50,000 unit capsule Generic drug:  ergocalciferol Your last dose was: Your next dose is: Take 50,000 Units by mouth every seven (7) days. [address] Units STOP taking these medications   
 raNITIdine hcl 300 mg Cap Where to Get Your Medications Information on where to get these meds will be given to you by the nurse or doctor. ! Ask your nurse or doctor about these medications  
  omeprazole 20 mg capsule Discharge Instructions For the next 12 hours you should not: 1. drive 2. drink alcohol 3. operate any machinery 4. engage in activities that require mental sharpness or manual dexterity such as   
 cooking 5. take any drugs other than those prescribed by a physician 6. make any legal or financial decisions Call your doctor's office immediately, if: 
 1. increased and continuing rectal bleeding 2. fever 3. increased abdominal pain Take it easy today and resume normal activity tomorrow. Resume previous diet. DISCHARGE SUMMARY from Nurse PATIENT INSTRUCTIONS: 
 
After general anesthesia or intravenous sedation, for 24 hours or while taking prescription Narcotics: · Limit your activities · Do not drive and operate hazardous machinery · Do not make important personal or business decisions · Do  not drink alcoholic beverages · If you have not urinated within 8 hours after discharge, please contact your surgeon on call. Report the following to your surgeon: 
· Excessive pain, swelling, redness or odor of or around the surgical area · Temperature over 100.5 · Nausea and vomiting lasting longer than 4 hours or if unable to take medications · Any signs of decreased circulation or nerve impairment to extremity: change in color, persistent  numbness, tingling, coldness or increase pain · Any questions What to do at Home: These are general instructions for a healthy lifestyle: No smoking/ No tobacco products/ Avoid exposure to second hand smoke Surgeon General's Warning:  Quitting smoking now greatly reduces serious risk to your health. Obesity, smoking, and sedentary lifestyle greatly increases your risk for illness A healthy diet, regular physical exercise & weight monitoring are important for maintaining a healthy lifestyle You may be retaining fluid if you have a history of heart failure or if you experience any of the following symptoms:  Weight gain of 3 pounds or more overnight or 5 pounds in a week, increased swelling in our hands or feet or shortness of breath while lying flat in bed. Please call your doctor as soon as you notice any of these symptoms; do not wait until your next office visit. Recognize signs and symptoms of STROKE: 
 
F-face looks uneven A-arms unable to move or move unevenly S-speech slurred or non-existent T-time-call 911 as soon as signs and symptoms begin-DO NOT go Back to bed or wait to see if you get better-TIME IS BRAIN. Warning Signs of HEART ATTACK Call 911 if you have these symptoms: 
? Chest discomfort. Most heart attacks involve discomfort in the center of the chest that lasts more than a few minutes, or that goes away and comes back. It can feel like uncomfortable pressure, squeezing, fullness, or pain. ? Discomfort in other areas of the upper body. Symptoms can include pain or discomfort in one or both arms, the back, neck, jaw, or stomach. ? Shortness of breath with or without chest discomfort. ? Other signs may include breaking out in a cold sweat, nausea, or lightheadedness. Don't wait more than five minutes to call [address]!  Fast action can save your life. Calling 911 is almost always the fastest way to get lifesaving treatment. Emergency Medical Services staff can begin treatment when they arrive  up to an hour sooner than if someone gets to the hospital by car. The discharge information has been reviewed with the patient. The patient verbalized understanding. Discharge medications reviewed with the patient and appropriate educational materials and side effects teaching were provided. ___________________________________________________________________________________________________________________________________ Patient armband removed and given to patient to take home. Patient was informed of the privacy risks if armband lost or stolen Introducing [facility]! Chaim Jang introduces MakeMyTrip.[email or web address] patient portal. Now you can access parts of your medical record, email your doctor's office, and request medication refills online. 1. In your internet browser, go to [email or web address] 2. Click on the First Time User? Click Here link in the Sign In box. You will see the New Member Sign Up page. 3. Enter your MakeMyTrip.com Access Code exactly as it appears below. You will not need to use this code after youve completed the sign-up process. If you do not sign up before the expiration date, you must request a new code. · [employer]ak Access Code: [email or web address] Expires: 10/23/2018  9:37 AM 
 
4. Enter the last four digits of your Social Security Number (xxxx) and Date of Birth (mm/dd/yyyy) as indicated and click Submit. You will be taken to the next sign-up page. 5. Create a eFlixt ID. This will be your MakeMyTrip.com login ID and cannot be changed, so think of one that is secure and easy to remember. 6. Create a MakeMyTrip.com password. You can change your password at any time. 7. Enter your Password Reset Question and Answer. This can be used at a later time if you forget your password. 8. Enter your e-mail address. You will receive e-mail notification when new information is available in [address] 9. Click Sign Up. You can now view and download portions of your medical record. 10. Click the Download Summary menu link to download a portable copy of your medical information. If you have questions, please visit the Frequently Asked Questions section of the VISEOhart website. Remember, [facility] is NOT to be used for urgent needs. For medical emergencies, dial 911. Now available from your iPhone and Android! Introducing Goldy Solitario As a [employer] patient, I wanted to make you aware of our electronic visit tool called Goldy Solitario. [employer] 24/7 allows you to connect within minutes with a medical provider 24 hours a day, seven days a week via a mobile device or tablet or logging into a secure website from your computer. You can access Goldy Solitario from anywhere in the [place]. A virtual visit might be right for you when you have a simple condition and feel like you just dont want to get out of bed, or cant get away from work for an appointment, when your regular [employer] provider is not available (evenings, weekends or holidays), or when youre out of town and need minor care. Electronic visits cost only $49 and if the [employer] 24/7 provider determines a prescription is needed to treat your condition, one can be electronically transmitted to a nearby pharmacy*. Please take a moment to enroll today if you have not already done so. The enrollment process is free and takes just a few minutes. To enroll, please download the [employer] 24/7 nilo to your tablet or phone, or visit [email or web address] to enroll on your computer.    
And, as an [address] [place] [address] patient with a [employer] account, the results of your visits will be scanned into your electronic medical record and your primary care provider will be able to view the scanned results. We urge you to continue to see your regular [facility] provider for your ongoing medical care. And while your primary care provider may not be the one available when you seek a [employer]y Ortiz[employer] virtual visit, the peace of mind you get from getting a real diagnosis real time can be priceless. For more information on [employer], view our Frequently Asked Questions (FAQs) at [email or web address]. Sincerely, 
 
Eris Neri MD 
Chief Medical Officer [employer] *:  certain medications cannot be prescribed via [employer] Providers Seen During Your Hospitalization Provider Specialty Primary office phone Fatou Leiva MD Gastroenterology [phone] Your Primary Care Physician (PCP) Primary Care Physician Office Phone Office Fax Cuate Page [phone] You are allergic to the following Allergen Reactions Aspirin Shortness of Breath Demerol (Meperidine) Shortness of Breath Doxycycline Shortness of Breath Morphine Shortness of Breath Neurontin (Gabapentin) Unable to [employer] Nortriptyline Other (comments)  
 hallucinations Tetracyclines Shortness of Breath Tylenol (Acetaminophen) Shortness of Breath Recent Documentation Weight Breastfeeding? BMI OB Status Smoking Status  
  
 110.4 kg No 43.12 kg/m2 Hysterectomy Never Smoker Emergency Contacts Name Discharge Info Relation Home Work Mobile Vinicio Lancaster DISCHARGE CAREGIVER [3] Spouse [3] [phone] Rosalia Rosales N/A  AT THIS TIME [6] Daughter [21] [phone] Patient Belongings The following personal items are in your possession at time of discharge: 
  Dental Appliances: None  Visual Aid: None Please provide this summary of care documentation to your next provider. Signatures-by signing, you are acknowledging that this After Visit Summary has been reviewed with you and you have received a copy. Patient Signature:  ____________________________________________________________ Date:  ____________________________________________________________  
  
Maame Feller Provider Signature:  ____________________________________________________________ Date:  ____________________________________________________________

## 2018-07-26 NOTE — PROCEDURES
Endoscopy Procedure Note    Patient: Julia Martinez MRN: [id number]  SSN: [id number]    YOB: 1955  Age: 59 y.o. Sex: female      Date/Time:  7/26/2018 11:01 AM    Esophagogastroduodenoscopy (EGD) Procedure Note    Procedure: Esophagogastroduodenoscopy with biopsy    IMPRESSION:   1. Mild, non-erosive, reflux esophagitis (LA grade A ). 2. Non-obstructing, ring-like, scarring at GE junction, left undisturbed. 3. Sliding hiatus hernia (3-4 cm in length). 4. Non-specific, non-erosive gastritis. Biopsies taken from the body and antrum to rule out H pylori infection. 5  Normal macroscopic appearance of the duodenum. Biopsies taken from D2 to rule out celiac disease, parasitic disease, SIBO. .. RECOMMENDATIONS:  1. Await pathology. 2. Switch from Pepcid/Zantac to Omeprazole 20 mg po qam.   3. Outpatient follow-up. Indication: GERD, bloating  :  Lg Sue MD  Referring Provider:   Dee Dee Denton MD  History: The history and physical exam were reviewed and updated. Endoscope: [id number]  Extent of Exam: second portion of the duodenum  ASA: Per Anesthesia  Anethesia/Sedation:  MAC anesthesia    Description of the procedure: The procedure was discussed with the patient including risks, benefits, alternatives including risks of iv sedation, bleeding, perforation and aspiration. A safety timeout was performed. The patient was placed in the left lateral decubitus position. A bite block was placed. The patient was given incremental doses of intravenous sedation until moderate sedation was achieved. The patients vital signs were monitored at all times including heart rate/rhythm, blood pressure and oxygen saturation. The endoscope was then passed under direct visualization to the second portion of the duodenum. The endoscope was then slowly withdrawn while visualizing the mucosa. In the stomach a retroflexion was performed and gastric fundus and cardia visualized. The endoscope was then slowly withdrawn. The patient was then transferred to recovery in stable condition. Findings:    Esophagus: Patchy erythema and edema was noted in the distal esophagus, abutting the GE junction. A ring-like narrowing was noted at the GE junction, secondary to peptic scarring, without significant luminal obstruction. It was left undisturbed. A sliding hiatus hernia measuring 3-4 cm in length was ntoed. There was no evidence of Molina's metaplasia. No mass lesions were seen. Stomach: The gastric mucosa was without ulcers, erosions, vascular, or mass lesions. The gastric folds were normal. There was no retained food in the stomach. Patchy erythema was seen in the antrum of the stomach. Biopsies were taken to rule out H pylori infection. The pylorus was not deformed. Duodenum: The duodenum mucosa was without ulcers, erosions, inflammatory changes, vascular or mass lesions. Biopsies were taken from the descending duodenum to rule out celiac disease, parasitic disease, SIBO. .. Specimens:   ID Type Source Tests Collected by Time Destination   1 : bx body and antrum r/o hpylori Preservative Gastric  Wilda Beyer MD 7/26/2018 1022 Pathology   2 : bx duodenum r/o celiac Preservative Duodenum  Wilda Beyer MD 7/26/2018 1023 Pathology   3 : bx polyp Preservative Rectum  Wilda Beyer MD 7/26/2018 1038 Pathology               Complications:   None; patient tolerated the procedure well. EBL:Minimal    Discharge disposition:  Home in the company of  when able to ambulate    Wilda Beyer MD  July 26, 2018  11:01 AM        Colonoscopy Report    Patient: Patricia Field MRN: [id number]  SSN: [id number]    YOB: 1955  Age: 59 y.o. Sex: female      Date of Procedure: 7/26/2018    IMPRESSION:  1. Diminutive polyp in the rectum, biopsied off.   2. Diverticulosis coli. 3. Internal hemorrhoids.      RECOMMENDATIONS:    Indication:  Hx of adenomatous colon polyps. Procedure Performed: Colonoscopy biopsy  Endoscopist: Michael Farley MD  Assistant: Ava Kelley. iBll Peck. ASA: Per Anesthesia  Mallampati Score: III (soft palate, base of uvula visible)  Anesthesia: MAC anesthesia  Endoscope: [id number]  Extent of Examination:cecum, which was identified by the ileocecal valve and appendiceal orifice  Quality of Preparation:     []  Excellent   [x]  Very Good   [] Fair but adequate   [] Fair, inadequate   []  Poor      Technique: The procedure was discussed with the patient including risks, benefits, alternatives including risks of IV sedation, bleeding, perforation and missed polyp. A safety timeout was performed. The patient was given incremental doses of Versed and Demerol to achieve moderate conscious sedation. The patients vital signs were monitored at all times including heart rate, rhythm, blood pressure and oxygen saturation. The patient was placed in left lateral position. When adequate sedation was achieved a perianal inspection and a digital rectal exam were performed. Video colonoscope was introduced into the rectum and advanced under direct vision up to the cecum, which was identified by the ileocecal valve and appendiceal orifice. The cecum was identified by IC valve, appendiceal orifice and crows foot. With adequate insufflation and maneuvering of the withdrawing scope, the colonic mucosa was visualized carefully. Retroflexion was performed in the rectum and the distal rectum visualized. The patient tolerated the procedure very well and was transferred to recovery area. Findings: No ulcers, erosions, inflammatory changes, vascular anomalies, or large mass lesions were seen. One diminutive, 5 mm, sessile polyp was seen in the rectum and was biopsied off. Diverticula were seen that predominated in the left colon. Internal hemorrhoids were noted.     EBL: minimal  Specimen:   ID Type Source Tests Collected by Time Destination   1 : bx body and antrum r/o hpylori Preservative Gastric  Calos Saul MD 7/26/2018 1022 Pathology   2 : bx duodenum r/o celiac Preservative Duodenum  Calos Saul MD 7/26/2018 1023 Pathology   3 : bx polyp Preservative Rectum  Calos Saul MD 7/26/2018 1038 Pathology       Calos Saul MD  July 26, 2018  11:01 AM

## 2018-07-26 NOTE — H&P
History and Physical    Beni Kumar [facility]  1955  [id number]  [id number]    Pre-Procedure Diagnosis:  z86.010 h/o colon polyps  k21.9  gerd      Evaluation of past illnesses, surgeries, or injuries:   YES  Past Medical History:   Diagnosis Date    Carpal tunnel syndrome     Colon polyp     Depression     Diabetes ([address]     Fibromyalgia     Hiatal hernia     Hypertension     Irregular heart rate     ARRYTHMIA    Osteoarthritis (arthritis due to wear and tear of joints)     PTSD (post-traumatic stress disorder)     Sleep apnea     Spinal stenosis      Past Surgical History:   Procedure Laterality Date    COLONOSCOPY N/A 2/21/2017    COLONOSCOPY performed by Christopher Henderson MD at [address] [place] [address] HX ABDOMINAL LAPAROSCOPY      CYST/tumors    HX APPENDECTOMY  1997    HX HYSTERECTOMY  1992    total    HX SALPINGO-OOPHORECTOMY  1997    BILATERAL       Allergies: Allergies   Allergen Reactions    Aspirin Shortness of Breath    Demerol [Meperidine] Shortness of Breath    Doxycycline Shortness of Breath    Morphine Shortness of Breath    Neurontin [Gabapentin] Unable to Obtain    Nortriptyline Other (comments)     hallucinations    Tetracyclines Shortness of Breath    Tylenol [Acetaminophen] Shortness of Breath       Previous reactions to sedation/analgesia?   NO    Review of current medications, supplement, herbals and nutraceuticals complete:  YES  Current Facility-Administered Medications   Medication Dose Route Frequency Provider Last Rate Last Dose    sodium chloride (NS) flush 5-10 mL  5-10 mL IntraVENous Q8H Angelo Boyd CRNA        sodium chloride (NS) flush 5-10 mL  5-10 mL IntraVENous PRN Angelo Boyd CRNA        famotidine (PEPCID) tablet 20 mg  20 mg Oral ONCE Angelo Boyd CRNA        insulin lispro (HUMALOG) injection   SubCUTAneous ONCE Angelo Boyd CRNA        lactated Ringers infusion  50 mL/hr IntraVENous CONTINUOUS Angelo Boyd CRNA 50 mL/hr at 07/26/18 0855 50 mL/hr at 07/26/18 0855          Pertinent labs reviewed? YES    History of substance abuse? NO  Family History   Problem Relation Age of Onset    Ovarian Cancer Mother 64    Ovarian Cancer Maternal Grandmother     Cancer Father      Prostate Ca     Social History     Social History    Marital status:      Spouse name: N/A    Number of children: N/A    Years of education: N/A     Occupational History    Not on file.      Social History Main Topics    Smoking status: Never Smoker    Smokeless tobacco: Never Used    Alcohol use Not on file    Drug use: No    Sexual activity: Yes     Partners: Male     Other Topics Concern    Not on file     Social History Narrative       Cardiac Status:  WNL    Mental Status:  WNL     Pulmonary Status:  WNL    NPO:  5-8    Assessment/Impression: GERD, hx of colon polyps    Plan of treatment: EGD and colonoscopy        Polly Balderas MD  7/26/2018  10:15 AM

## 2018-08-30 ENCOUNTER — OFFICE VISIT (OUTPATIENT)
Dept: FAMILY MEDICINE CLINIC | Age: 63
End: 2018-08-30

## 2018-08-30 VITALS
TEMPERATURE: 97.5 F | HEIGHT: 63 IN | BODY MASS INDEX: 44.79 KG/M2 | RESPIRATION RATE: 20 BRPM | HEART RATE: 110 BPM | DIASTOLIC BLOOD PRESSURE: 81 MMHG | WEIGHT: 252.8 LBS | OXYGEN SATURATION: 97 % | SYSTOLIC BLOOD PRESSURE: 127 MMHG

## 2018-08-30 DIAGNOSIS — I10 ESSENTIAL HYPERTENSION: ICD-10-CM

## 2018-08-30 DIAGNOSIS — F43.10 PTSD (POST-TRAUMATIC STRESS DISORDER): ICD-10-CM

## 2018-08-30 DIAGNOSIS — F32.1 MODERATE MAJOR DEPRESSION (HCC): ICD-10-CM

## 2018-08-30 DIAGNOSIS — J44.9 CHRONIC OBSTRUCTIVE PULMONARY DISEASE, UNSPECIFIED COPD TYPE (HCC): ICD-10-CM

## 2018-08-30 DIAGNOSIS — E66.01 MORBID OBESITY (HCC): ICD-10-CM

## 2018-08-30 DIAGNOSIS — Z00.00 ROUTINE GENERAL MEDICAL EXAMINATION AT A HEALTH CARE FACILITY: Primary | ICD-10-CM

## 2018-08-30 DIAGNOSIS — E11.9 TYPE 2 DIABETES MELLITUS WITHOUT COMPLICATION, WITHOUT LONG-TERM CURRENT USE OF INSULIN (HCC): ICD-10-CM

## 2018-08-30 LAB
BILIRUB UR QL STRIP: NEGATIVE
GLUCOSE UR-MCNC: NEGATIVE MG/DL
KETONES P FAST UR STRIP-MCNC: NEGATIVE MG/DL
PH UR STRIP: 5.5 [PH] (ref 4.6–8)
PROT UR QL STRIP: NEGATIVE
SP GR UR STRIP: 1.02 (ref 1–1.03)
UA UROBILINOGEN AMB POC: NORMAL (ref 0.2–1)
URINALYSIS CLARITY POC: CLEAR
URINALYSIS COLOR POC: NORMAL
URINE BLOOD POC: NORMAL
URINE LEUKOCYTES POC: NEGATIVE
URINE NITRITES POC: NEGATIVE

## 2018-08-30 NOTE — MR AVS SNAPSHOT
[address] [place] [address] [phone] 
[phone] Patient: Deny Landry MRN: [id number] JOM:[birth year] Visit Information Date & Time Provider Department Dept. Phone Encounter #  
 8/30/2018 10:30 AM Estelle Mcclain  [facility] [address] [phone] [id number] Upcoming Health Maintenance Date Due Hepatitis C Screening 1955 HEMOGLOBIN A1C Q6M 1955 LIPID PANEL Q1 1955 FOOT EXAM Q1 3/25/1966 MICROALBUMIN Q1 3/25/1966 EYE EXAM RETINAL OR DILATED Q1 3/25/1966 Pneumococcal 19-64 Medium Risk (1 of 1 - PPSV23) 3/25/1975 DTaP/Tdap/Td series (1 - Tdap) 3/25/1977 PAP AKA CERVICAL CYTOLOGY 3/25/1977 FOBT Q 1 YEAR AGE 50-75 3/25/2006 ZOSTER VACCINE AGE 60> 1/25/2016 BREAST CANCER SCRN MAMMOGRAM 2/19/2017 MEDICARE YEARLY EXAM 3/14/2018 Influenza Age 5 to Adult 8/1/2018 Allergies as of 8/30/2018  Review Complete On: 8/30/2018 By: Estelle Mcclain MD  
  
 Severity Noted Reaction Type Reactions Aspirin  07/28/2014    Shortness of Breath Demerol [Meperidine]  07/28/2014    Shortness of Breath Doxycycline  07/28/2014    Shortness of Breath Morphine  07/28/2014    Shortness of Breath Neurontin [Gabapentin]  07/28/2014    Unable to Obtain Nortriptyline  01/14/2016    Other (comments)  
 hallucinations Tetracyclines  07/28/2014    Shortness of Breath Tylenol [Acetaminophen]  07/28/2014    Shortness of Breath Current Immunizations  Never Reviewed No immunizations on file. Not reviewed this visit You Were Diagnosed With   
  
 Codes Comments Routine general medical examination at a health care facility    -  Primary ICD-10-CM: Z00.00 ICD-9-CM: V70.0 Morbid obesity ([facility]     ICD-10-CM: E66.01 
ICD-9-CM: 278.01 Type 2 diabetes mellitus without complication, without long-term current use of insulin (HCC)     ICD-10-CM: E11.9 ICD-9-CM: 250.00 Essential hypertension     ICD-10-CM: I10 
ICD-9-CM: 401.9 Chronic obstructive pulmonary disease, unspecified COPD type ([facility]     ICD-10-CM: J44.9 ICD-9-CM: 306 Vitals BP Pulse Temp Resp Height(growth percentile) Weight(growth percentile) 127/81 (BP 1 Location: Right arm, BP Patient Position: Sitting) (!) 110 97.5 °F (36.4 °C) (Oral) 20 5' 3\" (1.6 m) 252 lb 12.8 oz (114.7 kg) SpO2 BMI OB Status Smoking Status 97% 44.78 kg/m2 Hysterectomy Never Smoker BMI and BSA Data Body Mass Index Body Surface Area 44.78 kg/m 2 2.26 m 2 Preferred Pharmacy Pharmacy Name Phone [address] [facility], [address] [place], [place] [address]pike [phone] Your Updated Medication List  
  
   
This list is accurate as of 8/30/18 10:59 AM.  Always use your most recent med list.  
  
  
  
  
 atorvastatin 40 mg tablet Commonly known as:  LIPITOR Take 40 mg by mouth nightly. diclofenac EC 75 mg EC tablet Commonly known as:  VOLTAREN Take 75 mg by mouth two (2) times a day. DULoxetine 60 mg capsule Commonly known as:  CYMBALTA Take 60 mg by mouth daily. metoprolol tartrate 50 mg tablet Commonly known as:  LOPRESSOR Take  by mouth two (2) times a day. SITagliptin 100 mg tablet Commonly known as:  Iman Pierini Take 100 mg by mouth nightly. VITAMIN D2 50,000 unit capsule Generic drug:  ergocalciferol Take 50,000 Units by mouth every seven (7) days. We Performed the Following AMB POC URINALYSIS DIP STICK AUTO W/O MICRO [88571 CPT(R)] To-Do List   
 08/30/2018 Lab:  CBC WITH AUTOMATED DIFF   
  
 08/30/2018 Lab:  HEMOGLOBIN A1C WITH EAG   
  
 08/30/2018 Lab:  LIPID PANEL   
  
 08/30/2018 Lab:  METABOLIC PANEL, COMPREHENSIVE   
  
 08/30/2018 Lab:  MICROALBUMIN, UR, RAND W/ MICROALB/CREAT RATIO   
  
 08/30/2018   Lab:  T4, FREE   
  
 08/30/2018 Lab:  TSH 3RD GENERATION Patient Instructions We will call you with your lab results tomorrow. Recheck will depend on results. I have given you a list of psychiatric practices if you need a new psychiatrist 
 
 
  
Introducing [facility]! [employer] introduces [employer] patient portal. Now you can access parts of your medical record, email your doctor's office, and request medication refills online. 1. In your internet browser, go to [email or web address]. Place[employer]ak/[employer] 2. Click on the First Time User? Click Here link in the Sign In box. You will see the New Member Sign Up page. 3. Enter your [employer] Access Code exactly as it appears below. You will not need to use this code after youve completed the sign-up process. If you do not sign up before the expiration date, you must request a new code. · [employer] Access Code: [email or web address] Expires: 10/23/2018  9:37 AM 
 
4. Enter the last four digits of your Social Security Number (xxxx) and Date of Birth (mm/dd/yyyy) as indicated and click Submit. You will be taken to the next sign-up page. 5. Create a [employer] ID. This will be your [employer] login ID and cannot be changed, so think of one that is secure and easy to remember. 6. Create a [employer] password. You can change your password at any time. 7. Enter your Password Reset Question and Answer. This can be used at a later time if you forget your password. 8. Enter your e-mail address. You will receive e-mail notification when new information is available in [address] 9. Click Sign Up. You can now view and download portions of your medical record. 10. Click the Download Summary menu link to download a portable copy of your medical information. If you have questions, please visit the Frequently Asked Questions section of the [employer] website. Remember, [employer] is NOT to be used for urgent needs. For medical emergencies, dial 911. Now available from your iPhone and Android! Please provide this summary of care documentation to your next provider. Your primary care clinician is listed as Gorge Juarez. If you have any questions after today's visit, please call [phone].

## 2018-08-30 NOTE — PATIENT INSTRUCTIONS
We will call you with your lab results tomorrow. Recheck will depend on results.     I have given you a list of psychiatric practices if you need a new psychiatrist

## 2018-08-30 NOTE — PROGRESS NOTES
HISTORY OF PRESENT ILLNESS  Sonido Coughlin is a 59 y.o. female. HPI Comments: Ms. Ryan Boone presents to the clinic today because her insurance company told her to get a physical. She has multiple medical problems that are being treated by other physicians. Physical   Pertinent negatives include no chest pain, no abdominal pain, no headaches and no shortness of breath. Review of Systems   Constitutional: Negative for chills and fever. HENT: Negative for hearing loss and sore throat. Eyes: Negative for blurred vision and double vision. Respiratory: Negative for cough and shortness of breath. Cardiovascular: Negative for chest pain and palpitations. Gastrointestinal: Negative for abdominal pain, heartburn, nausea and vomiting. Genitourinary: Negative for dysuria, frequency and urgency. Musculoskeletal: Positive for back pain and myalgias. Neurological: Negative for headaches. Psychiatric/Behavioral: Positive for depression. Negative for suicidal ideas. The patient is not nervous/anxious and does not have insomnia. Physical Exam   Constitutional: She is oriented to person, place, and time. Vital signs are normal. She appears well-developed and well-nourished. HENT:   Right Ear: Tympanic membrane and ear canal normal.   Left Ear: Tympanic membrane and ear canal normal.   Nose: Nose normal.   Mouth/Throat: Uvula is midline, oropharynx is clear and moist and mucous membranes are normal.   Eyes: Pupils are equal, round, and reactive to light. Neck: No thyromegaly present. Cardiovascular: Normal rate and regular rhythm. Pulmonary/Chest: Effort normal and breath sounds normal. No respiratory distress. She has no wheezes. She has no rales. Abdominal: She exhibits no distension. There is no tenderness. There is no rebound. Lymphadenopathy:     She has no cervical adenopathy. Neurological: She is alert and oriented to person, place, and time. Skin: No rash noted. Psychiatric: She has a normal mood and affect. Her behavior is normal.   Nursing note and vitals reviewed. ASSESSMENT and PLAN    ICD-10-CM ICD-9-CM    1. Routine general medical examination at a health care facility Z00.00 V70.0 CBC WITH AUTOMATED DIFF      METABOLIC PANEL, COMPREHENSIVE      LIPID PANEL      AMB POC URINALYSIS DIP STICK AUTO W/O MICRO      TSH 3RD GENERATION      T4, FREE      CBC WITH AUTOMATED DIFF      METABOLIC PANEL, COMPREHENSIVE      LIPID PANEL      TSH 3RD GENERATION      T4, FREE   2. Morbid obesity (HCC) E66.01 278.01 LIPID PANEL      TSH 3RD GENERATION      T4, FREE      HEMOGLOBIN A1C WITH EAG      LIPID PANEL      TSH 3RD GENERATION      T4, FREE      HEMOGLOBIN A1C WITH EAG   3. Type 2 diabetes mellitus without complication, without long-term current use of insulin ([facility]) E11.9 250.00 LIPID PANEL      MICROALBUMIN, UR, RAND W/ MICROALB/CREAT RATIO      HEMOGLOBIN A1C WITH EAG      LIPID PANEL      MICROALBUMIN, UR, RAND W/ MICROALB/CREAT RATIO      HEMOGLOBIN A1C WITH EAG   4. Essential hypertension P56 097.2 METABOLIC PANEL, COMPREHENSIVE      TSH 3RD GENERATION      T4, FREE      METABOLIC PANEL, COMPREHENSIVE      TSH 3RD GENERATION      T4, FREE   5. Chronic obstructive pulmonary disease, unspecified COPD type ([facility] J44.9 496    6. Moderate major depression (HCC) F32.1 296.22    7.  PTSD (post-traumatic stress disorder) F43.10 309.81

## 2018-08-31 LAB
ALBUMIN SERPL-MCNC: 4.4 G/DL (ref 3.6–4.8)
ALBUMIN/CREAT UR: 3.8 MG/G CREAT (ref 0–30)
ALBUMIN/GLOB SERPL: 1.7 {RATIO} (ref 1.2–2.2)
ALP SERPL-CCNC: 112 IU/L (ref 39–117)
ALT SERPL-CCNC: 19 IU/L (ref 0–32)
AST SERPL-CCNC: 20 IU/L (ref 0–40)
BASOPHILS # BLD AUTO: 0 X10E3/UL (ref 0–0.2)
BASOPHILS NFR BLD AUTO: 0 %
BILIRUB SERPL-MCNC: 0.6 MG/DL (ref 0–1.2)
BUN SERPL-MCNC: 12 MG/DL (ref 8–27)
BUN/CREAT SERPL: 17 (ref 12–28)
CALCIUM SERPL-MCNC: 9.6 MG/DL (ref 8.7–10.3)
CHLORIDE SERPL-SCNC: 100 MMOL/L (ref 96–106)
CHOLEST SERPL-MCNC: 217 MG/DL (ref 100–199)
CO2 SERPL-SCNC: 23 MMOL/L (ref 20–29)
CREAT SERPL-MCNC: 0.72 MG/DL (ref 0.57–1)
CREAT UR-MCNC: 212.1 MG/DL
EOSINOPHIL # BLD AUTO: 0.2 X10E3/UL (ref 0–0.4)
EOSINOPHIL NFR BLD AUTO: 2 %
ERYTHROCYTE [DISTWIDTH] IN BLOOD BY AUTOMATED COUNT: 14.5 % (ref 12.3–15.4)
EST. AVERAGE GLUCOSE BLD GHB EST-MCNC: 146 MG/DL
GLOBULIN SER CALC-MCNC: 2.6 G/DL (ref 1.5–4.5)
GLUCOSE SERPL-MCNC: 151 MG/DL (ref 65–99)
HBA1C MFR BLD: 6.7 % (ref 4.8–5.6)
HCT VFR BLD AUTO: 42.3 % (ref 34–46.6)
HDLC SERPL-MCNC: 31 MG/DL
HGB BLD-MCNC: 14.1 G/DL (ref 11.1–15.9)
IMM GRANULOCYTES # BLD: 0 X10E3/UL (ref 0–0.1)
IMM GRANULOCYTES NFR BLD: 0 %
LDLC SERPL CALC-MCNC: 123 MG/DL (ref 0–99)
LYMPHOCYTES # BLD AUTO: 3.7 X10E3/UL (ref 0.7–3.1)
LYMPHOCYTES NFR BLD AUTO: 47 %
MCH RBC QN AUTO: 29.2 PG (ref 26.6–33)
MCHC RBC AUTO-ENTMCNC: 33.3 G/DL (ref 31.5–35.7)
MCV RBC AUTO: 88 FL (ref 79–97)
MICROALBUMIN UR-MCNC: 8 UG/ML
MONOCYTES # BLD AUTO: 0.4 X10E3/UL (ref 0.1–0.9)
MONOCYTES NFR BLD AUTO: 5 %
NEUTROPHILS # BLD AUTO: 3.8 X10E3/UL (ref 1.4–7)
NEUTROPHILS NFR BLD AUTO: 46 %
PLATELET # BLD AUTO: 330 X10E3/UL (ref 150–379)
POTASSIUM SERPL-SCNC: 4 MMOL/L (ref 3.5–5.2)
PROT SERPL-MCNC: 7 G/DL (ref 6–8.5)
RBC # BLD AUTO: 4.83 X10E6/UL (ref 3.77–5.28)
SODIUM SERPL-SCNC: 141 MMOL/L (ref 134–144)
T4 FREE SERPL-MCNC: 0.88 NG/DL (ref 0.82–1.77)
TRIGL SERPL-MCNC: 315 MG/DL (ref 0–149)
TSH SERPL DL<=0.005 MIU/L-ACNC: 1.15 UIU/ML (ref 0.45–4.5)
VLDLC SERPL CALC-MCNC: 63 MG/DL (ref 5–40)
WBC # BLD AUTO: 8.2 X10E3/UL (ref 3.4–10.8)

## 2018-09-19 DIAGNOSIS — N39.3 SUI (STRESS URINARY INCONTINENCE, FEMALE): ICD-10-CM

## 2018-09-19 DIAGNOSIS — I10 ESSENTIAL HYPERTENSION: ICD-10-CM

## 2018-09-19 DIAGNOSIS — E66.01 MORBID OBESITY (HCC): ICD-10-CM

## 2018-09-19 DIAGNOSIS — K21.9 GASTROESOPHAGEAL REFLUX DISEASE WITHOUT ESOPHAGITIS: ICD-10-CM

## 2018-09-19 NOTE — TELEPHONE ENCOUNTER
Requested Prescriptions     Pending Prescriptions Disp Refills    atorvastatin (LIPITOR) 40 mg tablet       Sig: Take 1 Tab by mouth nightly.  DULoxetine (CYMBALTA) 60 mg capsule       Sig: Take 1 Cap by mouth daily.  metoprolol tartrate (LOPRESSOR) 50 mg tablet       Sig: Take  by mouth two (2) times a day.  SITagliptin (JANUVIA) 100 mg tablet       Sig: Take 1 Tab by mouth nightly.  diclofenac EC (VOLTAREN) 75 mg EC tablet       Sig: Take 1 Tab by mouth two (2) times a day.

## 2018-09-20 RX ORDER — ATORVASTATIN CALCIUM 40 MG/1
40 TABLET, FILM COATED ORAL
Qty: 90 TAB | Refills: 1 | Status: SHIPPED | OUTPATIENT
Start: 2018-09-20 | End: 2019-05-22 | Stop reason: SDUPTHER

## 2018-09-20 RX ORDER — DICLOFENAC SODIUM 75 MG/1
75 TABLET, DELAYED RELEASE ORAL 2 TIMES DAILY
Qty: 180 TAB | Refills: 1 | Status: SHIPPED | OUTPATIENT
Start: 2018-09-20 | End: 2019-02-11

## 2018-09-20 RX ORDER — DULOXETIN HYDROCHLORIDE 60 MG/1
60 CAPSULE, DELAYED RELEASE ORAL DAILY
Qty: 90 CAP | Refills: 1 | Status: SHIPPED | OUTPATIENT
Start: 2018-09-20 | End: 2019-05-22 | Stop reason: SDUPTHER

## 2018-09-20 RX ORDER — METOPROLOL TARTRATE 50 MG/1
50 TABLET ORAL 2 TIMES DAILY
Qty: 180 TAB | Refills: 1 | Status: SHIPPED | OUTPATIENT
Start: 2018-09-20 | End: 2019-05-22 | Stop reason: SDUPTHER

## 2018-10-08 ENCOUNTER — OFFICE VISIT (OUTPATIENT)
Dept: FAMILY MEDICINE CLINIC | Age: 63
End: 2018-10-08

## 2018-10-08 VITALS
HEIGHT: 63 IN | SYSTOLIC BLOOD PRESSURE: 133 MMHG | HEART RATE: 110 BPM | BODY MASS INDEX: 45.71 KG/M2 | RESPIRATION RATE: 18 BRPM | WEIGHT: 258 LBS | DIASTOLIC BLOOD PRESSURE: 82 MMHG | TEMPERATURE: 98.8 F | OXYGEN SATURATION: 96 %

## 2018-10-08 DIAGNOSIS — E11.9 TYPE 2 DIABETES MELLITUS WITHOUT COMPLICATION, WITHOUT LONG-TERM CURRENT USE OF INSULIN (HCC): ICD-10-CM

## 2018-10-08 DIAGNOSIS — Z11.59 NEED FOR HEPATITIS C SCREENING TEST: ICD-10-CM

## 2018-10-08 DIAGNOSIS — F32.1 MODERATE MAJOR DEPRESSION (HCC): ICD-10-CM

## 2018-10-08 DIAGNOSIS — R53.83 FATIGUE, UNSPECIFIED TYPE: Primary | ICD-10-CM

## 2018-10-08 RX ORDER — ARIPIPRAZOLE 2 MG/1
2 TABLET ORAL DAILY
COMMUNITY
End: 2019-01-03

## 2018-10-08 NOTE — MR AVS SNAPSHOT
[address] [place] [address] [phone] 
[phone] Patient: Reyes Mcclelland MRN: [id number] OZQ:8/77/[birth year]373 Visit Information Date & Time Provider Department Dept. Phone Encounter #  
 10/8/2018 10:15 AM Jacquelin Grover MD [place] [phone] [id number] Upcoming Health Maintenance Date Due Hepatitis C Screening 1955 FOOT EXAM Q1 3/25/1966 EYE EXAM RETINAL OR DILATED Q1 3/25/1966 Pneumococcal 19-64 Medium Risk (1 of 1 - PPSV23) 3/25/1975 DTaP/Tdap/Td series (1 - Tdap) 3/25/1977 PAP AKA CERVICAL CYTOLOGY 3/25/1977 Shingrix Vaccine Age 50> (1 of 2) 3/25/2006 Influenza Age 5 to Adult 8/1/2018 HEMOGLOBIN A1C Q6M 2/28/2019 MICROALBUMIN Q1 8/30/2019 LIPID PANEL Q1 8/30/2019 BREAST CANCER SCRN MAMMOGRAM 12/6/2019 COLONOSCOPY 7/26/2023 Allergies as of 10/8/2018  Review Complete On: 10/8/2018 By: Jacquelin Grover MD  
  
 Severity Noted Reaction Type Reactions Aspirin  07/28/2014    Shortness of Breath Demerol [Meperidine]  07/28/2014    Shortness of Breath Doxycycline  07/28/2014    Shortness of Breath Morphine  07/28/2014    Shortness of Breath Neurontin [Gabapentin]  07/28/2014    Unable to Obtain Nortriptyline  01/14/2016    Other (comments)  
 hallucinations Tetracyclines  07/28/2014    Shortness of Breath Tylenol [Acetaminophen]  07/28/2014    Shortness of Breath Current Immunizations  Never Reviewed No immunizations on file. Not reviewed this visit You Were Diagnosed With   
  
 Codes Comments Fatigue, unspecified type    -  Primary ICD-10-CM: R53.83 ICD-9-CM: 780.79 Type 2 diabetes mellitus without complication, without long-term current use of insulin (HCC)     ICD-10-CM: E11.9 ICD-9-CM: 250.00 Moderate major depression (HCC)     ICD-10-CM: F32.1 ICD-9-CM: 296.22   
 Need for hepatitis C screening test     ICD-10-CM: Z11.59 
ICD-9-CM: V73.89 Vitals BP Pulse Temp Resp Height(growth percentile) Weight(growth percentile) 133/82 (BP 1 Location: Right arm, BP Patient Position: Sitting) (!) 110 98.8 °F (37.1 °C) (Oral) 18 5' 3\" (1.6 m) 258 lb (117 kg) SpO2 BMI OB Status Smoking Status 96% 45.7 kg/m2 Hysterectomy Never Smoker Vitals History BMI and BSA Data Body Mass Index Body Surface Area 45.7 kg/m 2 2.28 m 2 Preferred Pharmacy Pharmacy Name Phone [address] [facility], [address] [address] [place] [address] [address] [place] [address] Your Updated Medication List  
  
   
This list is accurate as of 10/8/18 11:02 AM.  Always use your most recent med list.  
  
  
  
  
 ABILIFY 2 mg tablet Generic drug:  ARIPiprazole Take 2 mg by mouth daily. atorvastatin 40 mg tablet Commonly known as:  LIPITOR Take 1 Tab by mouth nightly. diclofenac EC 75 mg EC tablet Commonly known as:  VOLTAREN Take 1 Tab by mouth two (2) times a day. DULoxetine 60 mg capsule Commonly known as:  CYMBALTA Take 1 Cap by mouth daily. metoprolol tartrate 50 mg tablet Commonly known as:  LOPRESSOR Take 1 Tab by mouth two (2) times a day. SITagliptin 100 mg tablet Commonly known as:  Charley Lonnie Take 1 Tab by mouth nightly. VITAMIN D2 50,000 unit capsule Generic drug:  ergocalciferol Take 50,000 Units by mouth every seven (7) days. To-Do List   
 10/08/2018 Lab:  CBC WITH AUTOMATED DIFF   
  
 10/08/2018 Lab:  HEPATITIS C AB   
  
 10/08/2018 Lab:  METABOLIC PANEL, COMPREHENSIVE   
  
 10/08/2018 Lab:  TSH 3RD GENERATION Patient Instructions I will send you an e-mail with any recommendations about the lab results. Recheck will depend on labs, otherwise 3 months for daibetes check up. Introducing [facility]! Dear Woodrow Ramos: Thank you for requesting a [employer] account. Our records indicate that you already have an active [employer] account. You can access your account anytime at [email or web address]. [employer] Did you know that you can access your hospital and ER discharge instructions at any time in [employer]? You can also review all of your test results from your hospital stay or ER visit. Additional Information If you have questions, please visit the Frequently Asked Questions section of the [employer] website at [email or web address] [email or web address]/. Remember, [employer] is NOT to be used for urgent needs. For medical emergencies, dial 911. Now available from your iPhone and Android! Please provide this summary of care documentation to your next provider. Your primary care clinician is listed as Tai Phipps. If you have any questions after today's visit, please call [phone].

## 2018-10-08 NOTE — PROGRESS NOTES
HISTORY OF PRESENT ILLNESS  Citlali Madrigal is a 59 y.o. female. HPI Comments: Ms. Ramonita Ellis is here because of fatigue for the past month or more. After being up for short periods of time, she then has to go lie down again. She has been off of abilify for 2 weeks and isn't taking any new meds. Blood sugars have been fine. Labs two months ago were good. Fatigue   Pertinent negatives include no chest pain, no abdominal pain, no headaches and no shortness of breath. Review of Systems   Constitutional: Positive for fatigue and malaise/fatigue. Negative for chills, diaphoresis and fever. Eyes: Negative for blurred vision and double vision. Respiratory: Negative for cough and shortness of breath. Cardiovascular: Negative for chest pain and palpitations. Gastrointestinal: Negative for abdominal pain, nausea and vomiting. Genitourinary: Negative for dysuria and urgency. Neurological: Positive for weakness. Negative for headaches. Psychiatric/Behavioral: Positive for depression. Negative for suicidal ideas. The patient does not have insomnia. Physical Exam   Constitutional: Vital signs are normal. She appears well-developed and well-nourished. HENT:   Right Ear: Tympanic membrane and ear canal normal.   Left Ear: Tympanic membrane and ear canal normal.   Nose: Nose normal.   Mouth/Throat: Uvula is midline, oropharynx is clear and moist and mucous membranes are normal.   Eyes: Pupils are equal, round, and reactive to light. Neck: No thyromegaly present. Cardiovascular: Normal rate, regular rhythm and normal heart sounds. Pulmonary/Chest: Effort normal and breath sounds normal. No respiratory distress. She has no wheezes. She has no rales. Abdominal: She exhibits no distension. Lymphadenopathy:     She has no cervical adenopathy. Skin: No rash noted. Psychiatric: She has a normal mood and affect. Her behavior is normal.   Nursing note and vitals reviewed.       ASSESSMENT and PLAN    ICD-10-CM ICD-9-CM    1. Fatigue, unspecified type R53.83 780.79 CBC WITH AUTOMATED DIFF      METABOLIC PANEL, COMPREHENSIVE      TSH 3RD GENERATION      CBC WITH AUTOMATED DIFF      METABOLIC PANEL, COMPREHENSIVE      TSH 3RD GENERATION   2. Type 2 diabetes mellitus without complication, without long-term current use of insulin (HCC) V68.2 086.36 METABOLIC PANEL, COMPREHENSIVE      METABOLIC PANEL, COMPREHENSIVE   3. Moderate major depression (HCC) F32.1 296.22    4.  Need for hepatitis C screening test Z11.59 V73.89 HEPATITIS C AB      HEPATITIS C AB

## 2018-10-08 NOTE — PROGRESS NOTES
Rm:1    Chief Complaint   Patient presents with    Fatigue     pt presents to office with c/o restlessness and fatigue     Depression Screening:  PHQ over the last two weeks 8/30/2018   Little interest or pleasure in doing things Nearly every day   Feeling down, depressed, irritable, or hopeless Several days   Total Score PHQ 2 4       Learning Assessment:  Learning Assessment 8/30/2018   PRIMARY LEARNER Patient   HIGHEST LEVEL OF EDUCATION - PRIMARY LEARNER  SOME COLLEGE   BARRIERS PRIMARY LEARNER NONE   CO-LEARNER CAREGIVER No   PRIMARY LANGUAGE ENGLISH   LEARNER PREFERENCE PRIMARY READING   ANSWERED BY patient   RELATIONSHIP SELF       Abuse Screening:  No flowsheet data found. Health Maintenance reviewed and discussed per provider: yes     Coordination of Care:    1. Have you been to the ER, urgent care clinic since your last visit? Hospitalized since your last visit? no    2. Have you seen or consulted any other health care providers outside of the [address] [place] [address] since your last visit? Include any pap smears or colon screening.  no

## 2018-10-08 NOTE — PATIENT INSTRUCTIONS
I will send you an e-mail with any recommendations about the lab results. Recheck will depend on labs, otherwise 3 months for diabetes check up.

## 2018-10-09 LAB
ALBUMIN SERPL-MCNC: 4.4 G/DL (ref 3.6–4.8)
ALBUMIN/GLOB SERPL: 2 {RATIO} (ref 1.2–2.2)
ALP SERPL-CCNC: 120 IU/L (ref 39–117)
ALT SERPL-CCNC: 17 IU/L (ref 0–32)
AST SERPL-CCNC: 15 IU/L (ref 0–40)
BASOPHILS # BLD AUTO: 0 X10E3/UL (ref 0–0.2)
BASOPHILS NFR BLD AUTO: 0 %
BILIRUB SERPL-MCNC: 0.5 MG/DL (ref 0–1.2)
BUN SERPL-MCNC: 12 MG/DL (ref 8–27)
BUN/CREAT SERPL: 16 (ref 12–28)
CALCIUM SERPL-MCNC: 9.3 MG/DL (ref 8.7–10.3)
CHLORIDE SERPL-SCNC: 105 MMOL/L (ref 96–106)
CO2 SERPL-SCNC: 25 MMOL/L (ref 20–29)
CREAT SERPL-MCNC: 0.74 MG/DL (ref 0.57–1)
EOSINOPHIL # BLD AUTO: 0.2 X10E3/UL (ref 0–0.4)
EOSINOPHIL NFR BLD AUTO: 2 %
ERYTHROCYTE [DISTWIDTH] IN BLOOD BY AUTOMATED COUNT: 14.8 % (ref 12.3–15.4)
GLOBULIN SER CALC-MCNC: 2.2 G/DL (ref 1.5–4.5)
GLUCOSE SERPL-MCNC: 145 MG/DL (ref 65–99)
HCT VFR BLD AUTO: 38.7 % (ref 34–46.6)
HCV AB S/CO SERPL IA: <0.1 S/CO RATIO (ref 0–0.9)
HGB BLD-MCNC: 12.5 G/DL (ref 11.1–15.9)
IMM GRANULOCYTES # BLD: 0 X10E3/UL (ref 0–0.1)
IMM GRANULOCYTES NFR BLD: 0 %
LYMPHOCYTES # BLD AUTO: 2.6 X10E3/UL (ref 0.7–3.1)
LYMPHOCYTES NFR BLD AUTO: 36 %
MCH RBC QN AUTO: 29.2 PG (ref 26.6–33)
MCHC RBC AUTO-ENTMCNC: 32.3 G/DL (ref 31.5–35.7)
MCV RBC AUTO: 90 FL (ref 79–97)
MONOCYTES # BLD AUTO: 0.5 X10E3/UL (ref 0.1–0.9)
MONOCYTES NFR BLD AUTO: 7 %
NEUTROPHILS # BLD AUTO: 3.8 X10E3/UL (ref 1.4–7)
NEUTROPHILS NFR BLD AUTO: 55 %
PLATELET # BLD AUTO: 288 X10E3/UL (ref 150–379)
POTASSIUM SERPL-SCNC: 4.1 MMOL/L (ref 3.5–5.2)
PROT SERPL-MCNC: 6.6 G/DL (ref 6–8.5)
RBC # BLD AUTO: 4.28 X10E6/UL (ref 3.77–5.28)
SODIUM SERPL-SCNC: 143 MMOL/L (ref 134–144)
TSH SERPL DL<=0.005 MIU/L-ACNC: 0.85 UIU/ML (ref 0.45–4.5)
WBC # BLD AUTO: 7 X10E3/UL (ref 3.4–10.8)

## 2019-01-03 ENCOUNTER — OFFICE VISIT (OUTPATIENT)
Dept: FAMILY MEDICINE CLINIC | Age: 64
End: 2019-01-03

## 2019-01-03 VITALS
HEIGHT: 63 IN | RESPIRATION RATE: 16 BRPM | SYSTOLIC BLOOD PRESSURE: 120 MMHG | WEIGHT: 259 LBS | BODY MASS INDEX: 45.89 KG/M2 | OXYGEN SATURATION: 97 % | TEMPERATURE: 99.9 F | DIASTOLIC BLOOD PRESSURE: 77 MMHG | HEART RATE: 121 BPM

## 2019-01-03 DIAGNOSIS — G47.33 OBSTRUCTIVE SLEEP APNEA SYNDROME: ICD-10-CM

## 2019-01-03 DIAGNOSIS — E11.9 TYPE 2 DIABETES MELLITUS WITHOUT COMPLICATION, WITHOUT LONG-TERM CURRENT USE OF INSULIN (HCC): ICD-10-CM

## 2019-01-03 DIAGNOSIS — K21.9 GASTROESOPHAGEAL REFLUX DISEASE WITHOUT ESOPHAGITIS: ICD-10-CM

## 2019-01-03 DIAGNOSIS — R53.83 FATIGUE, UNSPECIFIED TYPE: Primary | ICD-10-CM

## 2019-01-03 RX ORDER — HYDROCHLOROTHIAZIDE 12.5 MG/1
12.5 TABLET ORAL DAILY
COMMUNITY
End: 2019-02-11

## 2019-01-03 RX ORDER — BACLOFEN 10 MG/1
TABLET ORAL 3 TIMES DAILY
COMMUNITY
End: 2019-04-09 | Stop reason: ALTCHOICE

## 2019-01-03 NOTE — PROGRESS NOTES
HISTORY OF PRESENT ILLNESS  Chelsea Tracy is a 59 y.o. female. Ms. Jane Steinberg is here because she's been extremely tired for the last few months. She was seen here Oct 8 and labs were fine. She feels like there is wheezing in her vocal cords when she lies down. She does have a history of COPD. She also has a history of ROSEANNE but doesn't use her CPAP most of the time because \"I'm too exhausted to do it\". She says her blood sugars have been good. She has a history of reflux and her OTC antacids haven't been helping much. She would like something stronger. Review of Systems   Constitutional: Positive for malaise/fatigue. Negative for chills and fever. Eyes: Negative for blurred vision and double vision. Respiratory: Positive for wheezing. Negative for cough and shortness of breath. Cardiovascular: Negative for chest pain and palpitations. Gastrointestinal: Positive for heartburn. Negative for abdominal pain, nausea and vomiting. Neurological: Negative for headaches. Psychiatric/Behavioral: Negative for depression and suicidal ideas. Physical Exam   Constitutional: Vital signs are normal. She appears well-developed and well-nourished. HENT:   Right Ear: Tympanic membrane and ear canal normal.   Left Ear: Tympanic membrane and ear canal normal.   Nose: Nose normal.   Mouth/Throat: Uvula is midline, oropharynx is clear and moist and mucous membranes are normal.   Eyes: Pupils are equal, round, and reactive to light. Neck: No JVD present. No tracheal deviation present. No thyromegaly present. Cardiovascular: Normal rate, regular rhythm and normal heart sounds. Pulmonary/Chest: Effort normal and breath sounds normal. No respiratory distress. She has no wheezes. She has no rales. Abdominal: She exhibits no distension. There is no tenderness. There is no rebound. Skin: No rash noted. Psychiatric: She has a normal mood and affect.  Her behavior is normal. Thought content normal. Nursing note and vitals reviewed. ASSESSMENT and PLAN    ICD-10-CM ICD-9-CM    1. Fatigue, unspecified type R53.83 780.79 CBC WITH AUTOMATED DIFF      METABOLIC PANEL, COMPREHENSIVE      METABOLIC PANEL, COMPREHENSIVE      CBC WITH AUTOMATED DIFF   2. Obstructive sleep apnea syndrome G47.33 327.23    3. Type 2 diabetes mellitus without complication, without long-term current use of insulin (HCC) E11.9 250.00 HEMOGLOBIN A1C WITH EAG      HEMOGLOBIN A1C WITH EAG   4. Gastroesophageal reflux disease without esophagitis K21.9 530.81 H.  PYLORI BREATH TEST      H. PYLORI BREATH TEST

## 2019-01-03 NOTE — PATIENT INSTRUCTIONS
I have ordered some labs to see if we have a reason for the fatigue. In the meantime you need to use the CPAP machine every night and see if that makes a difference. Recheck if the symptoms don't resolve. We will call you about the breath test results. If positive, you will need 2 weeks of therapy. If negative, I'll put you on a stronger stomach medicine for 2-3 weeks to calm it down. Gastroesophageal Reflux Disease (GERD): Care Instructions  Your Care Instructions    Gastroesophageal reflux disease (GERD) is the backward flow of stomach acid into the esophagus. The esophagus is the tube that leads from your throat to your stomach. A one-way valve prevents the stomach acid from moving up into this tube. When you have GERD, this valve does not close tightly enough. If you have mild GERD symptoms including heartburn, you may be able to control the problem with antacids or over-the-counter medicine. Changing your diet, losing weight, and making other lifestyle changes can also help reduce symptoms. Follow-up care is a key part of your treatment and safety. Be sure to make and go to all appointments, and call your doctor if you are having problems. It's also a good idea to know your test results and keep a list of the medicines you take. How can you care for yourself at home? · Take your medicines exactly as prescribed. Call your doctor if you think you are having a problem with your medicine. · Your doctor may recommend over-the-counter medicine. For mild or occasional indigestion, antacids, such as Tums, Gaviscon, Mylanta, or Maalox, may help. Your doctor also may recommend over-the-counter acid reducers, such as Pepcid AC, Tagamet HB, Zantac 75, or Prilosec. Read and follow all instructions on the label. If you use these medicines often, talk with your doctor. · Change your eating habits. ? It's best to eat several small meals instead of two or three large meals. ?  After you eat, wait 2 to 3 hours before you lie down. ? Chocolate, mint, and alcohol can make GERD worse. ? Spicy foods, foods that have a lot of acid (like tomatoes and oranges), and coffee can make GERD symptoms worse in some people. If your symptoms are worse after you eat a certain food, you may want to stop eating that food to see if your symptoms get better. · Do not smoke or chew tobacco. Smoking can make GERD worse. If you need help quitting, talk to your doctor about stop-smoking programs and medicines. These can increase your chances of quitting for good. · If you have GERD symptoms at night, raise the head of your bed 6 to 8 inches by putting the frame on blocks or placing a foam wedge under the head of your mattress. (Adding extra pillows does not work.)  · Do not wear tight clothing around your middle. · Lose weight if you need to. Losing just 5 to 10 pounds can help. When should you call for help? Call your doctor now or seek immediate medical care if:    · You have new or different belly pain.     · Your stools are black and tarlike or have streaks of blood.    Watch closely for changes in your health, and be sure to contact your doctor if:    · Your symptoms have not improved after 2 days.     · Food seems to catch in your throat or chest.   Where can you learn more? Go to http://alejandro-hoa.info/. Enter S498 in the search box to learn more about \"Gastroesophageal Reflux Disease (GERD): Care Instructions. \"  Current as of: March 28, 2018  Content Version: 11.8  © 7461-3335 [employer]. Care instructions adapted under license by [employer] (which disclaims liability or warranty for this information). If you have questions about a medical condition or this instruction, always ask your healthcare professional. [address] any warranty or liability for your use of this information.

## 2019-01-03 NOTE — PROGRESS NOTES
Patient presents to the clinic for daily fatigue that began 3 months ago. Patient would also like to wheezing that began I month ago. Patient complains of an echoing in her vocal chords.

## 2019-01-04 LAB
ALBUMIN SERPL-MCNC: 4.4 G/DL (ref 3.6–4.8)
ALBUMIN/GLOB SERPL: 1.5 {RATIO} (ref 1.2–2.2)
ALP SERPL-CCNC: 137 IU/L (ref 39–117)
ALT SERPL-CCNC: 18 IU/L (ref 0–32)
AST SERPL-CCNC: 17 IU/L (ref 0–40)
BASOPHILS # BLD AUTO: 0 X10E3/UL (ref 0–0.2)
BASOPHILS NFR BLD AUTO: 0 %
BILIRUB SERPL-MCNC: 0.4 MG/DL (ref 0–1.2)
BUN SERPL-MCNC: 13 MG/DL (ref 8–27)
BUN/CREAT SERPL: 21 (ref 12–28)
CALCIUM SERPL-MCNC: 9.9 MG/DL (ref 8.7–10.3)
CHLORIDE SERPL-SCNC: 103 MMOL/L (ref 96–106)
CO2 SERPL-SCNC: 21 MMOL/L (ref 20–29)
CREAT SERPL-MCNC: 0.63 MG/DL (ref 0.57–1)
EOSINOPHIL # BLD AUTO: 0.2 X10E3/UL (ref 0–0.4)
EOSINOPHIL NFR BLD AUTO: 2 %
ERYTHROCYTE [DISTWIDTH] IN BLOOD BY AUTOMATED COUNT: 14.7 % (ref 12.3–15.4)
EST. AVERAGE GLUCOSE BLD GHB EST-MCNC: 157 MG/DL
GLOBULIN SER CALC-MCNC: 2.9 G/DL (ref 1.5–4.5)
GLUCOSE SERPL-MCNC: 199 MG/DL (ref 65–99)
HBA1C MFR BLD: 7.1 % (ref 4.8–5.6)
HCT VFR BLD AUTO: 41.5 % (ref 34–46.6)
HGB BLD-MCNC: 13.4 G/DL (ref 11.1–15.9)
IMM GRANULOCYTES # BLD: 0 X10E3/UL (ref 0–0.1)
IMM GRANULOCYTES NFR BLD: 0 %
LYMPHOCYTES # BLD AUTO: 3.4 X10E3/UL (ref 0.7–3.1)
LYMPHOCYTES NFR BLD AUTO: 37 %
MCH RBC QN AUTO: 29.2 PG (ref 26.6–33)
MCHC RBC AUTO-ENTMCNC: 32.3 G/DL (ref 31.5–35.7)
MCV RBC AUTO: 90 FL (ref 79–97)
MONOCYTES # BLD AUTO: 0.6 X10E3/UL (ref 0.1–0.9)
MONOCYTES NFR BLD AUTO: 7 %
NEUTROPHILS # BLD AUTO: 4.9 X10E3/UL (ref 1.4–7)
NEUTROPHILS NFR BLD AUTO: 54 %
PLATELET # BLD AUTO: 327 X10E3/UL (ref 150–379)
POTASSIUM SERPL-SCNC: 4.1 MMOL/L (ref 3.5–5.2)
PROT SERPL-MCNC: 7.3 G/DL (ref 6–8.5)
RBC # BLD AUTO: 4.59 X10E6/UL (ref 3.77–5.28)
SODIUM SERPL-SCNC: 142 MMOL/L (ref 134–144)
UREA BREATH TEST QL: NEGATIVE
WBC # BLD AUTO: 9.1 X10E3/UL (ref 3.4–10.8)

## 2019-01-04 NOTE — PROGRESS NOTES
Advise pt that her labs don't account for her fatigue.  She needs to use the CPAP every night and see what happens

## 2019-01-04 NOTE — PROGRESS NOTES
Call made to pt, both name and  verified.  Pt was advised of results, pt verbalized understanding and had no further questions or concerns

## 2019-01-14 ENCOUNTER — TELEPHONE (OUTPATIENT)
Dept: FAMILY MEDICINE CLINIC | Age: 64
End: 2019-01-14

## 2019-01-14 NOTE — TELEPHONE ENCOUNTER
Attempted to reach pt in ref to fax request for back brace from [facility].  Pt didn't answer, not able to leave mess because mailbox is full

## 2019-01-22 ENCOUNTER — TELEPHONE (OUTPATIENT)
Dept: FAMILY MEDICINE CLINIC | Age: 64
End: 2019-01-22

## 2019-01-22 NOTE — TELEPHONE ENCOUNTER
Pharmacy stated they needed to talk with a nurse in regards to patients prescriptions. Stated that they needed a verbal confirmation regarding a prescription the patient was trying to get. Adv will forward over the message.

## 2019-01-22 NOTE — TELEPHONE ENCOUNTER
Called pharmacy back. They asked if patient still needing hydrocortisone cream, omega 3 and lidocaine ointment. I let her know I do not see any of these on her chart. She stated will discontinue and let patient know she will need a follow-up.

## 2019-02-11 ENCOUNTER — OFFICE VISIT (OUTPATIENT)
Dept: FAMILY MEDICINE CLINIC | Age: 64
End: 2019-02-11

## 2019-02-11 VITALS
SYSTOLIC BLOOD PRESSURE: 120 MMHG | DIASTOLIC BLOOD PRESSURE: 78 MMHG | TEMPERATURE: 98.8 F | RESPIRATION RATE: 20 BRPM | HEART RATE: 100 BPM | OXYGEN SATURATION: 95 % | BODY MASS INDEX: 46.78 KG/M2 | HEIGHT: 63 IN | WEIGHT: 264 LBS

## 2019-02-11 DIAGNOSIS — M54.6 CHRONIC RIGHT-SIDED THORACIC BACK PAIN: ICD-10-CM

## 2019-02-11 DIAGNOSIS — R07.9 CHEST PAIN, UNSPECIFIED TYPE: ICD-10-CM

## 2019-02-11 DIAGNOSIS — R06.02 SHORTNESS OF BREATH: Primary | ICD-10-CM

## 2019-02-11 DIAGNOSIS — G89.29 CHRONIC RIGHT-SIDED THORACIC BACK PAIN: ICD-10-CM

## 2019-02-11 RX ORDER — BUDESONIDE AND FORMOTEROL FUMARATE DIHYDRATE 80; 4.5 UG/1; UG/1
2 AEROSOL RESPIRATORY (INHALATION) 2 TIMES DAILY
Qty: 1 INHALER | Refills: 5 | Status: SHIPPED | OUTPATIENT
Start: 2019-02-11

## 2019-02-11 RX ORDER — PANTOPRAZOLE SODIUM 20 MG/1
20 TABLET, DELAYED RELEASE ORAL DAILY
COMMUNITY

## 2019-02-11 RX ORDER — HYDROCHLOROTHIAZIDE 12.5 MG/1
12.5 TABLET ORAL DAILY
Qty: 90 TAB | Refills: 1 | Status: SHIPPED | OUTPATIENT
Start: 2019-02-11

## 2019-02-11 NOTE — PROGRESS NOTES
HISTORY OF PRESENT ILLNESS  Reg Mao is a 59 y.o. female. Ms. Xuan Moreno is hear for a couple of things. She has had shortness of breath for months. She had a rescue inhaler that she had been using for months but ran out. She points to her upper airway and talks about her vocal cords making a funny noise when she is SOB. When I saw her a month ago, she was complaining of fatigue. She hadn't been using her CPAP because it was too time consuming to hook up. She started using it again and didn't notice a difference in her fatigue. It also felt like it was choking her and making it harder to breath so she stopped it. On 1/14 she went to [place] [id number] ED because of chest pain and SOB. Stress test was never done because she had just eaten lunch. She still needs that done. She also has had back pain for a couple of months. Located R posterior rib cage, worse when she gets up from sitting. Review of Systems   Constitutional: Negative for chills and fever. Eyes: Negative for blurred vision and double vision. Respiratory: Positive for shortness of breath and wheezing. Negative for cough. Cardiovascular: Positive for chest pain. Negative for palpitations. Gastrointestinal: Negative for abdominal pain, nausea and vomiting. Genitourinary: Negative for dysuria. Musculoskeletal: Positive for back pain. Negative for myalgias. Neurological: Negative for tingling, tremors and headaches. Psychiatric/Behavioral: Negative for depression and suicidal ideas. Physical Exam   Constitutional: Vital signs are normal. She appears well-developed and well-nourished. HENT:   Right Ear: Tympanic membrane and ear canal normal.   Left Ear: Tympanic membrane and ear canal normal.   Nose: Nose normal.   Mouth/Throat: Uvula is midline, oropharynx is clear and moist and mucous membranes are normal.   Eyes: Pupils are equal, round, and reactive to light. Neck: No thyromegaly present.    Cardiovascular: Normal rate, regular rhythm and normal heart sounds. Pulmonary/Chest: Effort normal and breath sounds normal. No respiratory distress. She has no wheezes. She has no rales. Abdominal: She exhibits no distension. There is no tenderness. Musculoskeletal:   Mildly tender R posterior chest wall. No rash   Lymphadenopathy:     She has no cervical adenopathy. Skin: No rash noted. Psychiatric: She has a normal mood and affect. Her behavior is normal.   Nursing note and vitals reviewed. ASSESSMENT and PLAN    ICD-10-CM ICD-9-CM    1. Shortness of breath R06.02 786.05 budesonide-formoterol (SYMBICORT) 80-4.5 mcg/actuation HFAA   2. Chest pain, unspecified type R07.9 786.50 REFERRAL TO CARDIOLOGY   3.  Chronic right-sided thoracic back pain M54.6 724.1     G89.29 338.29

## 2019-02-11 NOTE — PROGRESS NOTES
Rm:2    Chief Complaint   Patient presents with    Back Pain     pt presents to office with c/o back pain Ernesto    Breathing Problem     Depression Screening:  PHQ over the last two weeks 8/30/2018   Little interest or pleasure in doing things Nearly every day   Feeling down, depressed, irritable, or hopeless Several days   Total Score PHQ 2 4       Learning Assessment:  Learning Assessment 8/30/2018   PRIMARY LEARNER Patient   HIGHEST LEVEL OF EDUCATION - PRIMARY LEARNER  SOME COLLEGE   BARRIERS PRIMARY LEARNER NONE   CO-LEARNER CAREGIVER No   PRIMARY LANGUAGE ENGLISH   LEARNER PREFERENCE PRIMARY READING   ANSWERED BY patient   RELATIONSHIP SELF       Abuse Screening:  No flowsheet data found. Health Maintenance reviewed and discussed per provider: yes     Coordination of Care:    1. Have you been to the ER, urgent care clinic since your last visit? Hospitalized since your last visit? no    2. Have you seen or consulted any other health care providers outside of the [address] [place] [address] since your last visit? Include any pap smears or colon screening.  no

## 2019-02-11 NOTE — PATIENT INSTRUCTIONS
I am starting you on a daily inhaler for your lungs, this should help out the breathing. If it doesn't, call for pulmonary referral.    I am referring you to cardiology because of your ER visit at [facility] on 1/14. They wanted a stress test but were unable to do it. For the back pain, I've prescribed an analgesic cream that you rub into the painful area 3 times daily until it goes away.     Recheck here in 1 month

## 2019-02-22 ENCOUNTER — HOSPITAL ENCOUNTER (OUTPATIENT)
Dept: LAB | Age: 64
Discharge: HOME | End: 2019-02-22
Payer: MEDICARE

## 2019-02-22 ENCOUNTER — OFFICE VISIT (OUTPATIENT)
Dept: FAMILY MEDICINE CLINIC | Age: 64
End: 2019-02-22

## 2019-02-22 VITALS
HEART RATE: 95 BPM | HEIGHT: 63 IN | RESPIRATION RATE: 20 BRPM | DIASTOLIC BLOOD PRESSURE: 71 MMHG | OXYGEN SATURATION: 95 % | SYSTOLIC BLOOD PRESSURE: 135 MMHG | TEMPERATURE: 98.5 F | BODY MASS INDEX: 46.42 KG/M2 | WEIGHT: 262 LBS

## 2019-02-22 DIAGNOSIS — R19.7 ACUTE DIARRHEA: ICD-10-CM

## 2019-02-22 DIAGNOSIS — K92.1 HEMATOCHEZIA: ICD-10-CM

## 2019-02-22 DIAGNOSIS — R10.9 LEFT SIDED ABDOMINAL PAIN: ICD-10-CM

## 2019-02-22 DIAGNOSIS — R10.9 LEFT SIDED ABDOMINAL PAIN: Primary | ICD-10-CM

## 2019-02-22 LAB
ALBUMIN SERPL-MCNC: 4.1 G/DL (ref 3.4–5)
ALBUMIN/GLOB SERPL: 1.4 {RATIO} (ref 0.8–1.7)
ALP SERPL-CCNC: 163 U/L (ref 45–117)
ALT SERPL-CCNC: 33 U/L (ref 13–56)
ANION GAP SERPL CALC-SCNC: 9 MMOL/L (ref 3–18)
AST SERPL-CCNC: 19 U/L (ref 15–37)
BASOPHILS # BLD: 0 K/UL (ref 0–0.1)
BASOPHILS NFR BLD: 0 % (ref 0–2)
BILIRUB SERPL-MCNC: 0.6 MG/DL (ref 0.2–1)
BUN SERPL-MCNC: 14 MG/DL (ref 7–18)
BUN/CREAT SERPL: 20 (ref 12–20)
CALCIUM SERPL-MCNC: 9.5 MG/DL (ref 8.5–10.1)
CHLORIDE SERPL-SCNC: 103 MMOL/L (ref 100–108)
CO2 SERPL-SCNC: 27 MMOL/L (ref 21–32)
CREAT SERPL-MCNC: 0.71 MG/DL (ref 0.6–1.3)
DIFFERENTIAL METHOD BLD: ABNORMAL
EOSINOPHIL # BLD: 0.3 K/UL (ref 0–0.4)
EOSINOPHIL NFR BLD: 3 % (ref 0–5)
ERYTHROCYTE [DISTWIDTH] IN BLOOD BY AUTOMATED COUNT: 14.3 % (ref 11.6–14.5)
GLOBULIN SER CALC-MCNC: 3 G/DL (ref 2–4)
GLUCOSE SERPL-MCNC: 159 MG/DL (ref 74–99)
HCT VFR BLD AUTO: 41.8 % (ref 35–45)
HGB BLD-MCNC: 13.7 G/DL (ref 12–16)
LYMPHOCYTES # BLD: 5 K/UL (ref 0.9–3.6)
LYMPHOCYTES NFR BLD: 49 % (ref 21–52)
MCH RBC QN AUTO: 30 PG (ref 24–34)
MCHC RBC AUTO-ENTMCNC: 32.8 G/DL (ref 31–37)
MCV RBC AUTO: 91.5 FL (ref 74–97)
MONOCYTES # BLD: 0.7 K/UL (ref 0.05–1.2)
MONOCYTES NFR BLD: 7 % (ref 3–10)
NEUTS SEG # BLD: 4.1 K/UL (ref 1.8–8)
NEUTS SEG NFR BLD: 41 % (ref 40–73)
PLATELET # BLD AUTO: 332 K/UL (ref 135–420)
PMV BLD AUTO: 11.6 FL (ref 9.2–11.8)
POTASSIUM SERPL-SCNC: 4.1 MMOL/L (ref 3.5–5.5)
PROT SERPL-MCNC: 7.1 G/DL (ref 6.4–8.2)
RBC # BLD AUTO: 4.57 M/UL (ref 4.2–5.3)
SODIUM SERPL-SCNC: 139 MMOL/L (ref 136–145)
WBC # BLD AUTO: 10.1 K/UL (ref 4.6–13.2)

## 2019-02-22 PROCEDURE — 80053 COMPREHEN METABOLIC PANEL: CPT

## 2019-02-22 PROCEDURE — 85025 COMPLETE CBC W/AUTO DIFF WBC: CPT

## 2019-02-22 RX ORDER — METRONIDAZOLE 500 MG/1
500 TABLET ORAL 3 TIMES DAILY
Qty: 30 TAB | Refills: 0 | Status: SHIPPED | OUTPATIENT
Start: 2019-02-22 | End: 2019-03-04

## 2019-02-22 NOTE — PROGRESS NOTES
Rm 1  Patient presents to the clinic c/o bloody stool and having bowel movements on self x 1 week. Depression Screening:  3 most recent PHQ Screens 8/30/2018   Little interest or pleasure in doing things Nearly every day   Feeling down, depressed, irritable, or hopeless Several days   Total Score PHQ 2 4       Learning Assessment:  Learning Assessment 8/30/2018   PRIMARY LEARNER Patient   HIGHEST LEVEL OF EDUCATION - PRIMARY LEARNER  SOME COLLEGE   BARRIERS PRIMARY LEARNER NONE   CO-LEARNER CAREGIVER No   PRIMARY LANGUAGE ENGLISH   LEARNER PREFERENCE PRIMARY READING   ANSWERED BY patient   RELATIONSHIP SELF       Abuse Screening:  No flowsheet data found. Health Maintenance reviewed and discussed per provider: yes     Coordination of Care:    1. Have you been to the ER, urgent care clinic since your last visit? Hospitalized since your last visit? no    2. Have you seen or consulted any other health care providers outside of the [address] [place] [address] since your last visit? Include any pap smears or colon screening.  No

## 2019-02-25 ENCOUNTER — TELEPHONE (OUTPATIENT)
Dept: FAMILY MEDICINE CLINIC | Age: 64
End: 2019-02-25

## 2019-02-25 NOTE — TELEPHONE ENCOUNTER
Called for a status check. Patient plans to turn in the stool studies this morning. She has not started the Flagyl yet. She reports little change in her symptoms. I advise that as soon as the stool is collected, proceed with the antibiotic. We will closely monitor. She agrees.

## 2019-02-27 ENCOUNTER — TELEPHONE (OUTPATIENT)
Dept: FAMILY MEDICINE CLINIC | Age: 64
End: 2019-02-27

## 2019-02-27 ENCOUNTER — HOSPITAL ENCOUNTER (OUTPATIENT)
Dept: LAB | Age: 64
Discharge: HOME | End: 2019-02-27
Payer: MEDICARE

## 2019-02-27 DIAGNOSIS — K92.1 HEMATOCHEZIA: ICD-10-CM

## 2019-02-27 DIAGNOSIS — R19.7 ACUTE DIARRHEA: ICD-10-CM

## 2019-02-27 DIAGNOSIS — R10.9 LEFT SIDED ABDOMINAL PAIN: ICD-10-CM

## 2019-02-27 DIAGNOSIS — R19.7 ACUTE DIARRHEA: Primary | ICD-10-CM

## 2019-02-27 LAB
C DIFF GDH STL QL: NEGATIVE
C DIFF TOX A+B STL QL IA: NEGATIVE
INTERPRETATION: NORMAL
WBC #/AREA STL HPF: NORMAL /HPF

## 2019-02-27 PROCEDURE — 87449 NOS EACH ORGANISM AG IA: CPT

## 2019-02-27 PROCEDURE — 36415 COLL VENOUS BLD VENIPUNCTURE: CPT

## 2019-02-27 PROCEDURE — 87427 SHIGA-LIKE TOXIN AG IA: CPT

## 2019-02-27 PROCEDURE — 89055 LEUKOCYTE ASSESSMENT FECAL: CPT

## 2019-02-27 PROCEDURE — 87045 FECES CULTURE AEROBIC BACT: CPT

## 2019-02-27 PROCEDURE — 82274 ASSAY TEST FOR BLOOD FECAL: CPT

## 2019-02-27 RX ORDER — CIPROFLOXACIN 500 MG/1
500 TABLET ORAL 2 TIMES DAILY
Qty: 20 TAB | Refills: 0 | Status: SHIPPED | OUTPATIENT
Start: 2019-02-27 | End: 2019-03-09

## 2019-02-27 NOTE — TELEPHONE ENCOUNTER
Called patient d/t missed appt today. She reports she thought card said her appt was yesterday (2/26) and that she missed it. She has acquired her stool samples but has not yet turned them in. She has not yet started the Flagyl. She reports she actually had some constipation yesterday but that she continues to have blood in her stools. She does continue to also have some diarrhea. Otherwise she reports she is relatively unchanged. I advise that it is imperative that she turn in her cultures today and that she start her antibiotics today. I sent an Rx for Cipro to her pharmacy to take in addition to the Flagyl. I also advise that I am referring her back to Dr. Keira Buitrago of GI (he completed her colonoscopy last July). She agrees. Asked that she f/u in the office with me Friday or Monday. She agrees.

## 2019-02-28 LAB — HEMOCCULT STL QL IA: NEGATIVE

## 2019-03-01 ENCOUNTER — OFFICE VISIT (OUTPATIENT)
Dept: CARDIOLOGY CLINIC | Age: 64
End: 2019-03-01

## 2019-03-01 VITALS
SYSTOLIC BLOOD PRESSURE: 127 MMHG | HEART RATE: 102 BPM | DIASTOLIC BLOOD PRESSURE: 72 MMHG | BODY MASS INDEX: 46.23 KG/M2 | OXYGEN SATURATION: 97 % | WEIGHT: 261 LBS

## 2019-03-01 DIAGNOSIS — R07.89 OTHER CHEST PAIN: Primary | ICD-10-CM

## 2019-03-01 DIAGNOSIS — R06.00 DYSPNEA, UNSPECIFIED TYPE: ICD-10-CM

## 2019-03-01 LAB
E COLI SXT STL QL IA: NEGATIVE
SPECIMEN SOURCE: 0

## 2019-03-01 RX ORDER — ARIPIPRAZOLE 15 MG/1
15 TABLET ORAL DAILY
COMMUNITY
End: 2019-03-18

## 2019-03-01 NOTE — PROGRESS NOTES
[facility]    Ms. Roscoe Robbins is a 43-year-old female with a history of diabetes, fibromyalgia, hypertension, sleep apnea    Patient was asked to come see me for initial evaluation. Patient has been experiencing some chest pain on and off for last 5-6 months. She described this discomfort as pressure and heavy sensation sometimes at rest sometimes with exertion which last anywhere from 5-10 minutes. Occasionally she feels like she is sweaty along with this symptoms. She also has been expressing some dyspnea and fatigue. Sometimes this discomfort goes into her left arm. She denies any PND or lower extremity swelling. This is happening probably 2 or 3 times a month. This is not always consistent  Denies any nausea, vomiting, abdominal pain, fever, chills, sputum production. No hematuria or other bleeding complaints    Past Medical History:   Diagnosis Date    Carpal tunnel syndrome     Colon polyp     Depression     Diabetes (HCC)     Fibromyalgia     Hiatal hernia     Hypertension     Obesity     Osteoarthritis (arthritis due to wear and tear of joints)     PTSD (post-traumatic stress disorder)     Sleep apnea     Spinal stenosis          Past Surgical History:   Procedure Laterality Date    COLONOSCOPY N/A 2/21/2017    COLONOSCOPY performed by Leatha Garza MD at [address] [place] [address] COLONOSCOPY N/A 7/26/2018    COLONOSCOPY performed by Amy Peguero MD at [facility] ENDOSCOPY    HX ABDOMINAL LAPAROSCOPY      CYST/tumors     [address]    total    HX SALPINGO-OOPHORECTOMY  1997    BILATERAL       Current Outpatient Medications   Medication Sig    ARIPiprazole (ABILIFY) 15 mg tablet Take 15 mg by mouth daily.  ciprofloxacin HCl (CIPRO) 500 mg tablet Take 1 Tab by mouth two (2) times a day for 10 days.     metroNIDAZOLE (FLAGYL) 500 mg tablet Take 1 Tab by mouth three (3) times daily for 10 days.  pantoprazole (PROTONIX) 20 mg tablet Take 20 mg by mouth daily.  hydroCHLOROthiazide (HYDRODIURIL) 12.5 mg tablet Take 1 Tab by mouth daily.  budesonide-formoterol (SYMBICORT) 80-4.5 mcg/actuation HFAA Take 2 Puffs by inhalation two (2) times a day.  baclofen (LIORESAL) 10 mg tablet Take  by mouth three (3) times daily.  atorvastatin (LIPITOR) 40 mg tablet Take 1 Tab by mouth nightly.  DULoxetine (CYMBALTA) 60 mg capsule Take 1 Cap by mouth daily.  metoprolol tartrate (LOPRESSOR) 50 mg tablet Take 1 Tab by mouth two (2) times a day.  SITagliptin (JANUVIA) 100 mg tablet Take 1 Tab by mouth nightly. No current facility-administered medications for this visit. Allergies and Sensitivities:  Allergies   Allergen Reactions    Aspirin Shortness of Breath    Demerol [Meperidine] Shortness of Breath    Doxycycline Shortness of Breath    Morphine Shortness of Breath    Neurontin [Gabapentin] Unable to Obtain    Nortriptyline Other (comments)     hallucinations    Tetracyclines Shortness of Breath    Tylenol [Acetaminophen] Shortness of Breath       Family History:  Family History   Problem Relation Age of Onset    Ovarian Cancer Mother 64    Ovarian Cancer Maternal Grandmother     Cancer Father         Prostate Ca       Social History:  Social History     Tobacco Use    Smoking status: Never Smoker    Smokeless tobacco: Never Used   Substance Use Topics    Alcohol use: No     Alcohol/week: 0.0 oz    Drug use: No     She  reports that  has never smoked. she has never used smokeless tobacco.  She  reports that she does not drink alcohol. Review of Systems:  Cardiac symptoms as noted above in HPI. All others negative. Denies fatigue, malaise, skin rash, joint pain, blurring vision, photophobia, neck pain, hemoptysis, chronic cough, nausea, vomiting, hematuria, burning micturition, BRBPR, chronic headaches.     Physical Exam:  BP Readings from Last 3 Encounters: 03/01/19 127/72   02/22/19 135/71   02/11/19 120/78         Pulse Readings from Last 3 Encounters:   03/01/19 (!) 102   02/22/19 95   02/11/19 100          Wt Readings from Last 3 Encounters:   03/01/19 261 lb (118.4 kg)   02/22/19 262 lb (118.8 kg)   02/11/19 264 lb (119.7 kg)       Constitutional: Oriented to person, place, and time. HENT: Head: Normocephalic and atraumatic. Neck: No JVD present. Carotid bruit is not appreciated. Cardiovascular: Regular rhythm. No murmur, gallop or rubs appreciated  Lung: Breath sounds normal. No respiratory distress. No ronchi or rales appreciated  Abdominal: No tenderness. No rebound and no guarding. Musculoskeletal: There is no lower extremity edema. No cynosis  Lymphadenopathy:  No cervical or supraclavicular adenopathy appriciated. Neurological: No gross motor deficit noted. Skin: No visible skin rash noted. No Ear discharge noted  Psychiatric: Normal mood and affect. Good distal pulse      Review of Data  LABS:   Lab Results   Component Value Date/Time    Sodium 139 02/22/2019 04:40 PM    Potassium 4.1 02/22/2019 04:40 PM    Chloride 103 02/22/2019 04:40 PM    CO2 27 02/22/2019 04:40 PM    Glucose 159 (H) 02/22/2019 04:40 PM    BUN 14 02/22/2019 04:40 PM    Creatinine 0.71 02/22/2019 04:40 PM     Lipids Latest Ref Rng & Units 8/30/2018   Chol, Total 100 - 199 mg/dL 217(H)   HDL >39 mg/dL 31(L)   LDL 0 - 99 mg/dL 123(H)   Trig 0 - 149 mg/dL 315(H)   Some recent data might be hidden     Lab Results   Component Value Date/Time    ALT (SGPT) 33 02/22/2019 04:40 PM     Lab Results   Component Value Date/Time    Hemoglobin A1c 7.1 (H) 01/03/2019 02:07 PM       EKG  (01/19) sinus rhythm at 88 bpm.  No pathologic Q waves.     ECHO    STRESS TEST (EST, PHARM, NUC, ECHO etc)    CATHETERIZATION    IMPRESSION & PLAN:  Ms. Enrique Varela is 61-year-old female with history of diabetes hypertension and hyperlipidemia    Chest pain:  She has been having chest pain for last 6-month as mentioned in HPI. I have asked her that she start taking aspirin over-the-counter. She is already on metoprolol 50 mg twice daily. She is already on atorvastatin. I have advised her to avoid any exertional activities. She was advised to go to the emergency department if needed. I am going to order nuclear stress test for further risk stratification. Echocardiogram has been ordered to rule out hypertensive cardia vascular heart disease in setting of chest pain    Hypertension:  Her blood pressure today's 127/70 2 mg mercury. Currently she is on metoprolol and hydrochlorothiazide. I recommend to continue same. Echocardiogram has been ordered to rule out hypertensive cardia vascular heart disease    Hyperlipidemia:  She is on atorvastatin and I recommend to continue same. Last LDL was 123. I recommend that she undergo repeat fasting lipid profile and if the LDL is not below 100 I recommend that the dose should be increased    Diabetes:  She is on Januvia. Goal hemoglobin A1c less than 7 is recommended. Importance of diet and exercise was discussed with patient. This plan was discussed with patient who is in agreement. Thank you for allowing me to participate in patient care. Please feel free to call me if you have any question or concern. Harlan Pathak MD  Please note: This document has been produced using voice recognition software. Unrecognized errors in transcription may be present.

## 2019-03-01 NOTE — PATIENT INSTRUCTIONS
Loki Dong will call to schedule your testing within 24-48 hours. If you do not hear from her, then please call her directly at [phone].     All testing/lab work is completed in [address] [place] [address]   at [facility]

## 2019-03-01 NOTE — PROGRESS NOTES
1. Have you been to the ER, urgent care clinic since your last visit? Hospitalized since your last visit? No     2. Have you seen or consulted any other health care providers outside of the [address] [place] [address] since your last visit? Include any pap smears or colon screening.   No

## 2019-03-02 LAB
BACTERIA SPEC CULT: NORMAL
SERVICE CMNT-IMP: NORMAL

## 2019-03-18 ENCOUNTER — HOSPITAL ENCOUNTER (OUTPATIENT)
Dept: LAB | Age: 64
Discharge: HOME | End: 2019-03-18
Payer: MEDICARE

## 2019-03-18 ENCOUNTER — OFFICE VISIT (OUTPATIENT)
Dept: INTERNAL MEDICINE CLINIC | Age: 64
End: 2019-03-18

## 2019-03-18 VITALS
SYSTOLIC BLOOD PRESSURE: 139 MMHG | TEMPERATURE: 98.2 F | HEART RATE: 85 BPM | RESPIRATION RATE: 18 BRPM | HEIGHT: 63 IN | WEIGHT: 260.6 LBS | DIASTOLIC BLOOD PRESSURE: 88 MMHG | OXYGEN SATURATION: 99 % | BODY MASS INDEX: 46.18 KG/M2

## 2019-03-18 DIAGNOSIS — R53.83 FATIGUE, UNSPECIFIED TYPE: Primary | ICD-10-CM

## 2019-03-18 DIAGNOSIS — R41.3 MEMORY LOSS: ICD-10-CM

## 2019-03-18 DIAGNOSIS — R53.83 FATIGUE, UNSPECIFIED TYPE: ICD-10-CM

## 2019-03-18 DIAGNOSIS — E11.9 DIABETES MELLITUS, STABLE (HCC): ICD-10-CM

## 2019-03-18 LAB
ALBUMIN SERPL-MCNC: 4.2 G/DL (ref 3.4–5)
ALBUMIN/GLOB SERPL: 1.4 {RATIO} (ref 0.8–1.7)
ALP SERPL-CCNC: 142 U/L (ref 45–117)
ALT SERPL-CCNC: 34 U/L (ref 13–56)
ANION GAP SERPL CALC-SCNC: 8 MMOL/L (ref 3–18)
AST SERPL-CCNC: 19 U/L (ref 15–37)
BILIRUB SERPL-MCNC: 0.6 MG/DL (ref 0.2–1)
BUN SERPL-MCNC: 19 MG/DL (ref 7–18)
BUN/CREAT SERPL: 27 (ref 12–20)
CALCIUM SERPL-MCNC: 9.4 MG/DL (ref 8.5–10.1)
CHLORIDE SERPL-SCNC: 104 MMOL/L (ref 100–108)
CO2 SERPL-SCNC: 28 MMOL/L (ref 21–32)
CREAT SERPL-MCNC: 0.7 MG/DL (ref 0.6–1.3)
ERYTHROCYTE [DISTWIDTH] IN BLOOD BY AUTOMATED COUNT: 14.2 % (ref 11.6–14.5)
EST. AVERAGE GLUCOSE BLD GHB EST-MCNC: 166 MG/DL
GLOBULIN SER CALC-MCNC: 3.1 G/DL (ref 2–4)
GLUCOSE SERPL-MCNC: 107 MG/DL (ref 74–99)
HBA1C MFR BLD: 7.4 % (ref 4.2–5.6)
HCT VFR BLD AUTO: 41.6 % (ref 35–45)
HGB BLD-MCNC: 13.5 G/DL (ref 12–16)
MCH RBC QN AUTO: 29.3 PG (ref 24–34)
MCHC RBC AUTO-ENTMCNC: 32.5 G/DL (ref 31–37)
MCV RBC AUTO: 90.4 FL (ref 74–97)
PLATELET # BLD AUTO: 337 K/UL (ref 135–420)
PMV BLD AUTO: 11.2 FL (ref 9.2–11.8)
POTASSIUM SERPL-SCNC: 4 MMOL/L (ref 3.5–5.5)
PROT SERPL-MCNC: 7.3 G/DL (ref 6.4–8.2)
RBC # BLD AUTO: 4.6 M/UL (ref 4.2–5.3)
SODIUM SERPL-SCNC: 140 MMOL/L (ref 136–145)
TSH SERPL DL<=0.05 MIU/L-ACNC: 1.6 UIU/ML (ref 0.36–3.74)
VIT B12 SERPL-MCNC: 464 PG/ML (ref 211–911)
WBC # BLD AUTO: 10.2 K/UL (ref 4.6–13.2)

## 2019-03-18 PROCEDURE — 82306 VITAMIN D 25 HYDROXY: CPT

## 2019-03-18 PROCEDURE — 83036 HEMOGLOBIN GLYCOSYLATED A1C: CPT

## 2019-03-18 PROCEDURE — 86780 TREPONEMA PALLIDUM: CPT

## 2019-03-18 PROCEDURE — 84443 ASSAY THYROID STIM HORMONE: CPT

## 2019-03-18 PROCEDURE — 80053 COMPREHEN METABOLIC PANEL: CPT

## 2019-03-18 PROCEDURE — 85027 COMPLETE CBC AUTOMATED: CPT

## 2019-03-18 PROCEDURE — 82607 VITAMIN B-12: CPT

## 2019-03-18 PROCEDURE — 36415 COLL VENOUS BLD VENIPUNCTURE: CPT

## 2019-03-18 RX ORDER — ARIPIPRAZOLE 5 MG/1
TABLET ORAL
Refills: 0 | COMMUNITY
Start: 2019-02-19

## 2019-03-18 NOTE — PROGRESS NOTES
ROOM # 1    Nacho Lancaster presents today for   Chief Complaint   Patient presents with    Fatigue     for a long time    Memory Loss     forgetful for last month    Results      review results of CT       Nacho Lancaster preferred language for health care discussion is english/other. Is someone accompanying this pt? no    Is the patient using any DME equipment during [address]? no    Depression Screening:  3 most recent PHQ Screens 8/30/2018   Little interest or pleasure in doing things Nearly every day   Feeling down, depressed, irritable, or hopeless Several days   Total Score PHQ 2 4       Learning Assessment:  Learning Assessment 8/30/2018   PRIMARY LEARNER Patient   HIGHEST LEVEL OF EDUCATION - PRIMARY LEARNER  SOME COLLEGE   BARRIERS PRIMARY LEARNER NONE   CO-LEARNER CAREGIVER No   PRIMARY LANGUAGE ENGLISH   LEARNER PREFERENCE PRIMARY READING   ANSWERED BY patient   RELATIONSHIP SELF       Abuse Screening:  No flowsheet data found. Fall Risk  No flowsheet data found. Health Maintenance reviewed and discussed per provider. Yes    Bryon Rivera is due for   Health Maintenance Due   Topic Date Due    FOOT EXAM Q1  03/25/1966    EYE EXAM RETINAL OR DILATED  03/25/1966    Pneumococcal 19-64 Medium Risk (1 of 1 - PPSV23) 03/25/1975    DTaP/Tdap/Td series (1 - Tdap) 03/25/1977    PAP AKA CERVICAL CYTOLOGY  03/25/1977    Shingrix Vaccine Age 50> (1 of 2) 03/25/2006    Influenza Age 5 to Adult  08/01/2018    MEDICARE YEARLY EXAM  10/12/2018   HM to be d/w provider      Please order/place referral if appropriate. Advance Directive:  1. Do you have an advance directive in place? Patient Reply: no    2. If not, would you like material regarding how to put one in place? Patient Reply: no    Coordination of Care:  1. Have you been to the ER, urgent care clinic since your last visit? Hospitalized since your last visit? no    2.  Have you seen or consulted any other health care providers outside of the [facility] since your last visit? Include any pap smears or colon screening.  no

## 2019-03-18 NOTE — PROGRESS NOTES
Chief Complaint   Patient presents with    Fatigue     for a long time    Memory Loss     forgetful for last month    Results      review results of CT       HPI:     Baldo Santillan is a 59 y.o.  female with history of type 2 DM and hypertension   here for the above complaint. She has fatigue and this has been going on for over 3 months and worse now. She has shortness of breath and chest pain off and on and dizziness. Chest pain lasts 5 minutes. It happens when she is at rest. She always has tingling in her left arm. She feels hot most of the time. She also says switch to cold. She has diarrhea. No abdominal pain. Type 2 DM: 99 today. She is seeing cardiology already for the chest pain. She is scheduled next week for stress ECHO. She had a lesion in vaginal area and told her to see ob/gyn. She has memory loss and sometimes can't remember present information. Discussed CT abd/pelvis results with pt. This was ordered by Dr. Carlos Tavarez. Told her to discuss with him about results. No acute abnormalities in the abdomen or pelvis. Diverticulosis without imaging signs of acute diverticulitis. Chronic bronchiectasis and scarring lung bases with possible new tiny 2.8 mm nodule posterior right pleural margin. Advanced degenerative changes of the mid to lower lumbar spine with potentially significant central canal stenosis and foraminal stenoses. Signed By: Karla Osler, MD on 3/11/2019 12:55 PM   Result Narrative   EXAM: CT ABDOMEN AND PELVIS WITH CONTRAST    CLINICAL INDICATION/HISTORY: H72.83:  Left upper quadrant pain    COMPARISON: CT abdomen and pelvis 12/20/2017    TECHNIQUE:  CT abdomen and pelvis with oral and 100 cc of Omnipaque 350 IV contrast.  All CT scans at this facility are performed using dose optimization technique as appropriate to the performed examination, to include automated exposure control, adjustment of the mA and/or kV according to patient's size (including appropriate matching for site-specific examinations), or use of an iterative reconstruction technique. FINDINGS:     Bronchiectasis and scarring bilateral lower lobes. Linear opacity along the fissure at the left lung base is noted. There may be a new tiny nodule posterior right lung base measuring 2.8 mm along the posterior pleural margin. Small hiatal hernia. There are no dilated loops of large or small bowel. There is diverticulosis involving the left colon and moderately at the sigmoid colon. The appendix is not visualized. Liver, spleen, adrenal glands and kidneys and pancreas are unremarkable. Gallbladder is unremarkable. Abdominal aorta is normal in caliber. No pathologic adenopathy along the inguinal regions or retroperitoneal or mesentery. Trace anterolisthesis of L3 in relation to L4. Advanced degenerative changes of the lower lumbar spine with severe degenerative facet arthropathy at L3/L4 through L5/S1 levels. Likely severe foraminal stenosis on the right at L3/L4, possibly on the left at L3/4 and also on the right at L4/L5 and left L5/S1. 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 Other Result Information   Interface, [employer] [facility] - 03/11/2019 12:57 PM EDT  EXAM: CT ABDOMEN AND PELVIS WITH CONTRAST    CLINICAL INDICATION/HISTORY: R10.12: Left upper quadrant pain    COMPARISON: CT abdomen and pelvis 12/20/2017    TECHNIQUE:  CT abdomen and pelvis with oral and 100 cc of Omnipaque 350 IV contrast.  All CT scans at this facility are performed using dose optimization technique as appropriate to the performed examination, to include automated exposure control, adjustment of the mA and/or kV according to patient's size (including appropriate matching for site-specific examinations), or use of an iterative reconstruction technique. FINDINGS:     Bronchiectasis and scarring bilateral lower lobes. Linear opacity along the fissure at the left lung base is noted. There may be a new tiny nodule posterior right lung base measuring 2.8 mm along the posterior pleural margin. Small hiatal hernia. There are no dilated loops of large or small bowel. There is diverticulosis involving the left colon and moderately at the sigmoid colon. The appendix is not visualized. Liver, spleen, adrenal glands and kidneys and pancreas are unremarkable. Gallbladder is unremarkable. Abdominal aorta is normal in caliber. No pathologic adenopathy along the inguinal regions or retroperitoneal or mesentery. Trace anterolisthesis of L3 in relation to L4. Advanced degenerative changes of the lower lumbar spine with severe degenerative facet arthropathy at L3/L4 through L5/S1 levels. Likely severe foraminal stenosis on the right at L3/L4, possibly on the left at L3/4 and also on the right at L4/L5 and left L5/S1. 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 IMPRESSION     No acute abnormalities in the abdomen or pelvis. Diverticulosis without imaging signs of acute diverticulitis. Chronic bronchiectasis and scarring lung bases with possible new tiny 2.8 mm nodule posterior right pleural margin. Advanced degenerative changes of the mid to lower lumbar spine with potentially significant central canal stenosis and foraminal stenoses.         Signed By: Placido Carbone MD on 3/11/2019 12:55 PM         Past Medical History:   Diagnosis Date    Carpal tunnel syndrome     Colon polyp     Depression     Diabetes ([facility] [address]     Fibromyalgia     Hiatal hernia     Hypertension     Obesity     Osteoarthritis (arthritis due to wear and tear of joints)     PTSD (post-traumatic stress disorder)     Sleep apnea     Spinal stenosis      Past Surgical History:   Procedure Laterality Date    COLONOSCOPY N/A 2/21/2017    COLONOSCOPY performed by Roger Mena MD at [address] [place] [address] COLONOSCOPY N/A 7/26/2018    COLONOSCOPY [facility] by Onnie Sandifer, MD at [facility] ENDOSCOPY    HX ABDOMINAL LAPAROSCOPY      CYST/tumors    HX APPENDECTOMY  1997    HX HYSTERECTOMY  1992    total    HX SALPINGO-OOPHORECTOMY  1997    BILATERAL     Current Outpatient Medications   Medication Sig    ARIPiprazole (ABILIFY) 5 mg tablet take 1 tablet by mouth once daily    pantoprazole (PROTONIX) 20 mg tablet Take 20 mg by mouth daily.  hydroCHLOROthiazide (HYDRODIURIL) 12.5 mg tablet Take 1 Tab by mouth daily.  budesonide-formoterol (SYMBICORT) 80-4.5 mcg/actuation HFAA Take 2 Puffs by inhalation two (2) times a day.  baclofen (LIORESAL) 10 mg tablet Take  by mouth three (3) times daily.  atorvastatin (LIPITOR) 40 mg tablet Take 1 Tab by mouth nightly.  DULoxetine (CYMBALTA) 60 mg capsule Take 1 Cap by mouth daily.  metoprolol tartrate (LOPRESSOR) 50 mg tablet Take 1 Tab by mouth two (2) times a day.  SITagliptin (JANUVIA) 100 mg tablet Take 1 Tab by mouth nightly. No current facility-administered medications for this visit. Health Maintenance   Topic Date Due    FOOT EXAM Q1  03/25/1966    EYE EXAM RETINAL OR DILATED  03/25/1966    Pneumococcal 19-64 Medium Risk (1 of 1 - PPSV23) 03/25/1975    DTaP/Tdap/Td series (1 - Tdap) 03/25/1977    PAP AKA CERVICAL CYTOLOGY  03/25/1977    Shingrix Vaccine Age 50> (1 of 2) 03/25/2006    Influenza Age 9 to Adult  08/01/2018    MEDICARE YEARLY EXAM  10/12/2018    HEMOGLOBIN A1C Q6M  07/03/2019    MICROALBUMIN Q1  08/30/2019    LIPID PANEL Q1  08/30/2019    BREAST CANCER SCRN MAMMOGRAM  12/06/2019    COLONOSCOPY  07/26/2023    Hepatitis C Screening  Completed       There is no immunization history on file for this patient. No LMP recorded. Patient has had a hysterectomy. Allergies and Intolerances:    Allergies   Allergen Reactions    Acetaminophen Shortness of Breath     Other reaction(s): unknown    Aspirin Shortness of Breath     Other reaction(s): unknown    Demerol [Meperidine] Shortness of Breath    Doxycycline Shortness of Breath and Hives    Gabapentin Unable to Obtain     Other reaction(s): unknown    Morphine Shortness of Breath    Nortriptyline Other (comments)     hallucinations    Tetracyclines Shortness of Breath       Family History:   Family History   Problem Relation Age of Onset    Ovarian Cancer Mother 64    Ovarian Cancer Maternal Grandmother     Cancer Father         Prostate Ca       Social History:   She  reports that  has never smoked. she has never used smokeless tobacco.  She  reports that she does not drink alcohol. ·     OBJECTIVE:   Physical exam:   Visit Vitals  /88 (BP 1 Location: Left arm, BP Patient Position: Sitting)   Pulse 85   Temp 98.2 °F (36.8 °C) (Oral)   Resp 18   Ht 5' 3\" (1.6 m)   Wt 260 lb 9.6 oz (118.2 kg)   SpO2 99%   BMI 46.16 kg/m²        Generally: Pleasant female in no acute distress  Cardiac Exam: regular, rate, and rhythm. Normal S1 and S2. No murmurs, gallops, or rubs  Pulmonary exam: Clear to auscultation bilaterally  Abdominal exam: Positive bowel sounds in all four quadrants, soft, nondistended, nontender  Extremities: 2+ dorsalis pedis pulses bilaterally.  No pedal edema    bilaterally    LABS/RADIOLOGICAL TESTS:  Lab Results   Component Value Date/Time    WBC 10.1 02/22/2019 04:40 PM    HGB 13.7 02/22/2019 04:40 PM    HCT 41.8 02/22/2019 04:40 PM    PLATELET 346 21/50/7776 04:40 PM     Lab Results   Component Value Date/Time    Sodium 139 02/22/2019 04:40 PM    Potassium 4.1 02/22/2019 04:40 PM    Chloride 103 02/22/2019 04:40 PM    CO2 27 02/22/2019 04:40 PM    Glucose 159 (H) 02/22/2019 04:40 PM    BUN 14 02/22/2019 04:40 PM    Creatinine 0.71 02/22/2019 04:40 PM     Lab Results   Component Value Date/Time    Cholesterol, total 217 (H) 08/30/2018 11:05 AM    HDL Cholesterol 31 (L) 08/30/2018 11:05 AM    LDL, calculated 123 (H) 08/30/2018 11:05 AM    Triglyceride 315 (H) 08/30/2018 11:05 AM     No results found for: GPT  Previous labs    ASSESSMENT/PLAN:    1. Fatigue, unspecified type  -     CBC W/O DIFF; Future  -     TSH 3RD GENERATION; Future  -     METABOLIC PANEL, COMPREHENSIVE; Future  -     VITAMIN B12; Future  -     VITAMIN D, 25 HYDROXY; Future    2. Memory loss  -     CBC W/O DIFF; Future  -     TSH 3RD GENERATION; Future  -     METABOLIC PANEL, COMPREHENSIVE; Future  -     VITAMIN B12; Future  -     VITAMIN D, 25 HYDROXY; Future  -     T PALLIDUM AB; Future    3. Diabetes mellitus, stable ([facility] [address]  -     HEMOGLOBIN A1C WITH EAG; Future      4. Patient verbalized understanding and agreement with the plan. 5. Patient was given an after-visit summary. 6. Follow-up Disposition:  Return if symptoms worsen or fail to improve. or sooner if worsening symptoms.           Maggie Pineda M.D.

## 2019-03-19 DIAGNOSIS — E55.9 VITAMIN D DEFICIENCY: Primary | ICD-10-CM

## 2019-03-19 LAB
25(OH)D3 SERPL-MCNC: 16.6 NG/ML (ref 30–100)
T PALLIDUM AB SER QL IA: NONREACTIVE

## 2019-03-19 RX ORDER — ERGOCALCIFEROL 1.25 MG/1
50000 CAPSULE ORAL
Qty: 4 CAP | Refills: 0 | Status: SHIPPED | OUTPATIENT
Start: 2019-03-19 | End: 2019-04-10

## 2019-03-19 NOTE — PROGRESS NOTES
Attempted to contact pt at  number, no answer. Lvm for pt to return call to office at [phone]. Will continue to try to contact pt.

## 2019-03-19 NOTE — PROGRESS NOTES
Pt contacted at home number. 2 pt identifiers confirmed. Pt informed of below. She states that she is having RUQ pain and she does not check her sugar daily. Informed the importance of doing so. Pt verbalized understanding. No other questions at this time.

## 2019-03-19 NOTE — PROGRESS NOTES
Please let pt know that labs were normal except:    1) vitamin D low at 16.6. This could be the cause of her fatigue. Will start vitamin D2 50,000 international units  Take one po weekly x 4 weeks #4 no refills. Will recheck levels after 4 weeks. 2) HgA1c high at 7. 4.glucose 107. How are her sugars doing? 3) Alk phos at 142, but better. Any RUQ pain, yellowing of eyes or skin?

## 2019-03-26 ENCOUNTER — HOSPITAL ENCOUNTER (OUTPATIENT)
Dept: NON INVASIVE DIAGNOSTICS | Age: 64
Discharge: HOME | End: 2019-03-26
Attending: INTERNAL MEDICINE
Payer: MEDICARE

## 2019-03-26 ENCOUNTER — HOSPITAL ENCOUNTER (OUTPATIENT)
Dept: NUCLEAR MEDICINE | Age: 64
Discharge: HOME | End: 2019-03-26
Attending: INTERNAL MEDICINE
Payer: MEDICARE

## 2019-03-26 VITALS
DIASTOLIC BLOOD PRESSURE: 68 MMHG | SYSTOLIC BLOOD PRESSURE: 122 MMHG | HEIGHT: 63 IN | WEIGHT: 260 LBS | BODY MASS INDEX: 46.07 KG/M2

## 2019-03-26 VITALS
DIASTOLIC BLOOD PRESSURE: 68 MMHG | WEIGHT: 260 LBS | HEIGHT: 63 IN | SYSTOLIC BLOOD PRESSURE: 122 MMHG | BODY MASS INDEX: 46.07 KG/M2

## 2019-03-26 DIAGNOSIS — R07.89 OTHER CHEST PAIN: ICD-10-CM

## 2019-03-26 DIAGNOSIS — R06.00 DYSPNEA, UNSPECIFIED TYPE: ICD-10-CM

## 2019-03-26 LAB
ECHO PULMONARY ARTERY SYSTOLIC PRESSURE (PASP): 31 MMHG
STRESS BASELINE HR: 81 BPM
STRESS ESTIMATED WORKLOAD: 1 METS
STRESS EXERCISE DUR MIN: NORMAL
STRESS PEAK DIAS BP: 64 MMHG
STRESS PEAK SYS BP: 135 MMHG
STRESS PERCENT HR ACHIEVED: 69 %
STRESS POST PEAK HR: 108 BPM
STRESS RATE PRESSURE PRODUCT: NORMAL BPM*MMHG
STRESS ST DEPRESSION: 0 MM
STRESS ST ELEVATION: 0 MM
STRESS TARGET HR: 157 BPM

## 2019-03-26 PROCEDURE — 74011250636 HC RX REV CODE- 250/636: Performed by: INTERNAL MEDICINE

## 2019-03-26 PROCEDURE — A9500 TC99M SESTAMIBI: HCPCS

## 2019-03-26 PROCEDURE — 93306 TTE W/DOPPLER COMPLETE: CPT

## 2019-03-26 PROCEDURE — 93017 CV STRESS TEST TRACING ONLY: CPT

## 2019-03-26 RX ADMIN — REGADENOSON 0.4 MG: 0.08 INJECTION, SOLUTION INTRAVENOUS at 11:37

## 2019-04-09 ENCOUNTER — OFFICE VISIT (OUTPATIENT)
Dept: CARDIOLOGY CLINIC | Age: 64
End: 2019-04-09

## 2019-04-09 VITALS
OXYGEN SATURATION: 98 % | SYSTOLIC BLOOD PRESSURE: 106 MMHG | WEIGHT: 257 LBS | HEART RATE: 90 BPM | DIASTOLIC BLOOD PRESSURE: 66 MMHG | BODY MASS INDEX: 45.53 KG/M2

## 2019-04-09 DIAGNOSIS — E66.01 MORBID OBESITY, UNSPECIFIED OBESITY TYPE (HCC): ICD-10-CM

## 2019-04-09 DIAGNOSIS — I10 ESSENTIAL HYPERTENSION WITH GOAL BLOOD PRESSURE LESS THAN 140/90: ICD-10-CM

## 2019-04-09 DIAGNOSIS — E78.00 PURE HYPERCHOLESTEROLEMIA: ICD-10-CM

## 2019-04-09 DIAGNOSIS — R06.00 DYSPNEA, UNSPECIFIED TYPE: Primary | ICD-10-CM

## 2019-04-09 NOTE — PROGRESS NOTES
[facility]    Ms. Norman Valdovinos is a 62 y.o. female with a history of diabetes, fibromyalgia, hypertension, sleep apnea    Patient is here today for follow-up appointment. She denies any chest pain or chest tightness since last time she has undergone cardiac testing. Her main concern is, daytime fatigue and feels like she is sleepy and tired. She has stable dyspnea on moderate exertion. She admits that she has gained weight. She denies any palpitation, presyncope or syncope. Denies any nausea, vomiting, abdominal pain, fever, chills, sputum production. No hematuria or other bleeding complaints    Past Medical History:   Diagnosis Date    Carpal tunnel syndrome     Colon polyp     Depression     Diabetes (HCC)     Fibromyalgia     Hiatal hernia     Hypertension     Obesity     Osteoarthritis (arthritis due to wear and tear of joints)     PTSD (post-traumatic stress disorder)     Sleep apnea     Spinal stenosis          Past Surgical History:   Procedure Laterality Date    COLONOSCOPY N/A 2/21/2017    COLONOSCOPY performed by Gris Leahy MD at [address] [place] [address] COLONOSCOPY N/A 7/26/2018    COLON[facility]COPY performed by Zoe Or[facility]ga [facility] at [facility] ENDOSCOPY    HX ABDOMINAL LAPAROSCOPY      CYST/tumors     [address]    total    HX SALPINGO-OOPHORECTOMY  1997    BILATERAL       Current Outpatient Medications   Medication Sig    ergocalciferol (ERGOCALCIFEROL) 50,000 unit capsule Take 1 Cap by mouth every seven (7) days for 4 doses.  ARIPiprazole (ABILIFY) 5 mg tablet take 1 tablet by mouth once daily    pantoprazole (PROTONIX) 20 mg tablet Take 20 mg by mouth daily.  hydroCHLOROthiazide (HYDRODIURIL) 12.5 mg tablet Take 1 Tab by mouth daily.  budesonide-formoterol (SYMBICORT) 80-4.5 mcg/actuation HFAA Take 2 Puffs by inhalation two (2) times a day.     atorvastatin (LIPITOR) 40 mg tablet Take 1 Tab by mouth nightly.  DULoxetine (CYMBALTA) 60 mg capsule Take 1 Cap by mouth daily.  metoprolol tartrate (LOPRESSOR) 50 mg tablet Take 1 Tab by mouth two (2) times a day.  SITagliptin (JANUVIA) 100 mg tablet Take 1 Tab by mouth nightly. No current facility-administered medications for this visit. Allergies and Sensitivities:  Allergies   Allergen Reactions    Acetaminophen Shortness of Breath     Other reaction(s): unknown    Aspirin Shortness of Breath     Other reaction(s): unknown    Demerol [Meperidine] Shortness of Breath    Doxycycline Shortness of Breath and Hives    Gabapentin Unable to Obtain     Other reaction(s): unknown    Morphine Shortness of Breath    Nortriptyline Other (comments)     hallucinations    Tetracyclines Shortness of Breath       Family History:  Family History   Problem Relation Age of Onset    Ovarian Cancer Mother 64    Ovarian Cancer Maternal Grandmother     Cancer Father         Prostate Ca       Social History:  Social History     Tobacco Use    Smoking status: Never Smoker    Smokeless tobacco: Never Used   Substance Use Topics    Alcohol use: No     Alcohol/week: 0.0 oz    Drug use: No     She  reports that she has never smoked. She has never used smokeless tobacco.  She  reports that she does not drink alcohol. Review of Systems:  Cardiac symptoms as noted above in HPI. All others negative. Denies fatigue, malaise, skin rash, joint pain, blurring vision, photophobia, neck pain, hemoptysis, chronic cough, nausea, vomiting, hematuria, burning micturition, BRBPR, chronic headaches.     Physical Exam:  BP Readings from Last 3 Encounters:   04/09/19 106/66   03/26/19 122/68   03/26/19 122/68         Pulse Readings from Last 3 Encounters:   04/09/19 90   03/18/19 85   03/01/19 (!) 102          Wt Readings from Last 3 Encounters:   04/09/19 257 lb (116.6 kg)   03/26/19 260 lb (117.9 kg)   03/26/19 260 lb (117.9 kg) Constitutional: Oriented to person, place, and time. HENT: Head: Normocephalic and atraumatic. Neck: No JVD present. Carotid bruit is not appreciated. Cardiovascular: Regular rhythm. No murmur, gallop or rubs appreciated  Lung: Breath sounds normal. No respiratory distress. No ronchi or rales appreciated  Abdominal: No tenderness. No rebound and no guarding. Musculoskeletal: There is no lower extremity edema. No cynosis    Review of Data  LABS:   Lab Results   Component Value Date/Time    Sodium 140 03/18/2019 03:23 PM    Potassium 4.0 03/18/2019 03:23 PM    Chloride 104 03/18/2019 03:23 PM    CO2 28 03/18/2019 03:23 PM    Glucose 107 (H) 03/18/2019 03:23 PM    BUN 19 (H) 03/18/2019 03:23 PM    Creatinine 0.70 03/18/2019 03:23 PM     Lipids Latest Ref Rng & Units 8/30/2018   Chol, Total 100 - 199 mg/dL 217(H)   HDL >39 mg/dL 31(L)   LDL 0 - 99 mg/dL 123(H)   Trig 0 - 149 mg/dL 315(H)   Some recent data might be hidden     Lab Results   Component Value Date/Time    ALT (SGPT) 34 03/18/2019 03:23 PM     Lab Results   Component Value Date/Time    Hemoglobin A1c 7.4 (H) 03/18/2019 03:23 PM       EKG  (01/19) sinus rhythm at 88 bpm.  No pathologic Q waves. ECHO (03/19)  Left Ventricle Normal systolic function (ejection fraction normal). The cavity size is decreased. Mild concentric hypertrophy observed. The estimated ejection fraction is 56 - 60%. Visually measured ejection fraction. There is age-appropriate left ventricular diastolic function. Left Atrium Normal cavity size. Right Ventricle Normal cavity size and global systolic function. Right Atrium Normal size. Aortic Valve Trileaflet valve structure, no stenosis and no regurgitation. Mitral Valve Normal valve structure, no stenosis and no regurgitation. Mild mitral annular calcification. Tricuspid Valve Normal valve structure and no stenosis. Trace tricuspid valve regurgitation. Pulmonary arterial systolic pressure is 00.6 mmHg.  There is no evidence of pulmonary hypertension. STRESS TEST (03/19)  · Baseline ECG: Normal EKG. · Negative stress test.  · Gated SPECT: Left ventricular function post-stress was normal. Calculated ejection fraction is 64%. · Left ventricular perfusion is probably normal.  · There was no convincing evidence of significant reversible defect to suggest on going major ischemia. Inferior defect is most consistent with diaphragmatic attenuation artifact  · Myocardial perfusion imaging supports a low risk stress test.    IMPRESSION & PLAN:  Ms. Norman Valdovinos is 62 y.o. female with history of diabetes hypertension and hyperlipidemia    Chest pain:  No chest pain since last time. Nuclear stress test and echocardiogram in March 2019, low risk. Discussed with patient    Hypertension:  Her blood pressure today's 127/70 mg mercury. Currently she is on metoprolol and hydrochlorothiazide. I recommend to continue same. Echocardiogram has been ordered to rule out hypertensive cardia vascular heart disease    Hyperlipidemia:  She is on atorvastatin and I recommend to continue same. Last LDL was 123. I recommend that she undergo repeat fasting lipid profile and if the LDL is not below 100 I recommend that the dose should be increased. Diabetes:  She is on Januvia. Goal hemoglobin A1c less than 7 is recommended. Importance of diet and exercise was discussed with patient. I have asked her that she follow-up with her sleep specialist because of her daytime fatigue tiredness and sleepiness to see if she has any worsening of her sleep apnea. She is going to call them and make an appointment    This plan was discussed with patient who is in agreement. Thank you for allowing me to participate in patient care. Please feel free to call me if you have any question or concern. Jose Luis Lim MD  Please note: This document has been produced using voice recognition software. Unrecognized errors in transcription may be present.

## 2019-04-09 NOTE — PROGRESS NOTES
1. Have you been to the ER, urgent care clinic since your last visit? Hospitalized since your last visit? No     2. Have you seen or consulted any other health care providers outside of the [address] [place] [address] since your last visit? Include any pap smears or colon screening.   No

## 2019-05-22 ENCOUNTER — TELEPHONE (OUTPATIENT)
Dept: INTERNAL MEDICINE CLINIC | Age: 64
End: 2019-05-22

## 2019-05-22 DIAGNOSIS — I10 ESSENTIAL HYPERTENSION: ICD-10-CM

## 2019-05-22 DIAGNOSIS — N39.3 SUI (STRESS URINARY INCONTINENCE, FEMALE): ICD-10-CM

## 2019-05-22 DIAGNOSIS — K21.9 GASTROESOPHAGEAL REFLUX DISEASE WITHOUT ESOPHAGITIS: ICD-10-CM

## 2019-05-22 DIAGNOSIS — E66.01 MORBID OBESITY (HCC): ICD-10-CM

## 2019-05-22 RX ORDER — ATORVASTATIN CALCIUM 40 MG/1
40 TABLET, FILM COATED ORAL
Qty: 90 TAB | Refills: 0 | Status: SHIPPED | OUTPATIENT
Start: 2019-05-22 | End: 2019-05-25 | Stop reason: SDUPTHER

## 2019-05-22 RX ORDER — DULOXETIN HYDROCHLORIDE 60 MG/1
60 CAPSULE, DELAYED RELEASE ORAL DAILY
Qty: 90 CAP | Refills: 0 | Status: SHIPPED | OUTPATIENT
Start: 2019-05-22 | End: 2019-05-25 | Stop reason: SDUPTHER

## 2019-05-22 RX ORDER — METOPROLOL TARTRATE 50 MG/1
50 TABLET ORAL 2 TIMES DAILY
Qty: 180 TAB | Refills: 0 | Status: SHIPPED | OUTPATIENT
Start: 2019-05-22 | End: 2019-05-25 | Stop reason: SDUPTHER

## 2019-05-22 NOTE — TELEPHONE ENCOUNTER
Requested Prescriptions     Pending Prescriptions Disp Refills    atorvastatin (LIPITOR) 40 mg tablet 90 Tab 1     Sig: Take 1 Tab by mouth nightly.  DULoxetine (CYMBALTA) 60 mg capsule 90 Cap 1     Sig: Take 1 Cap by mouth daily.  metoprolol tartrate (LOPRESSOR) 50 mg tablet 180 Tab 1     Sig: Take 1 Tab by mouth two (2) times a day.

## 2019-05-24 NOTE — TELEPHONE ENCOUNTER
Please schedule appt in regard to this. There are risks with chronic use of this. Wish to discuss risks and possible alternatives.

## 2019-05-25 DIAGNOSIS — E66.01 MORBID OBESITY (HCC): ICD-10-CM

## 2019-05-25 DIAGNOSIS — I10 ESSENTIAL HYPERTENSION: ICD-10-CM

## 2019-05-25 DIAGNOSIS — N39.3 SUI (STRESS URINARY INCONTINENCE, FEMALE): ICD-10-CM

## 2019-05-25 DIAGNOSIS — K21.9 GASTROESOPHAGEAL REFLUX DISEASE WITHOUT ESOPHAGITIS: ICD-10-CM

## 2019-05-28 ENCOUNTER — TELEPHONE (OUTPATIENT)
Dept: INTERNAL MEDICINE CLINIC | Age: 64
End: 2019-05-28

## 2019-05-29 RX ORDER — ATORVASTATIN CALCIUM 40 MG/1
TABLET, FILM COATED ORAL
Qty: 90 TAB | Refills: 0 | Status: SHIPPED | OUTPATIENT
Start: 2019-05-29 | End: 2019-05-29 | Stop reason: SDUPTHER

## 2019-05-29 RX ORDER — ATORVASTATIN CALCIUM 40 MG/1
TABLET, FILM COATED ORAL
Qty: 90 TAB | Refills: 0 | Status: SHIPPED | OUTPATIENT
Start: 2019-05-29

## 2019-05-29 RX ORDER — METOPROLOL TARTRATE 50 MG/1
TABLET ORAL
Qty: 180 TAB | Refills: 0 | Status: SHIPPED | OUTPATIENT
Start: 2019-05-29

## 2019-05-29 RX ORDER — DULOXETIN HYDROCHLORIDE 60 MG/1
CAPSULE, DELAYED RELEASE ORAL
Qty: 90 CAP | Refills: 0 | Status: SHIPPED | OUTPATIENT
Start: 2019-05-29

## 2019-05-29 RX ORDER — SITAGLIPTIN 100 MG/1
TABLET, FILM COATED ORAL
Qty: 90 TAB | Refills: 0 | Status: SHIPPED | OUTPATIENT
Start: 2019-05-29 | End: 2019-05-29 | Stop reason: SDUPTHER

## 2019-05-29 NOTE — TELEPHONE ENCOUNTER
Requested Prescriptions     Pending Prescriptions Disp Refills    atorvastatin (LIPITOR) 40 mg tablet 90 Tab 0

## 2019-11-25 ENCOUNTER — OFFICE VISIT (OUTPATIENT)
Dept: OBGYN CLINIC | Age: 64
End: 2019-11-25

## 2019-11-25 VITALS
RESPIRATION RATE: 18 BRPM | WEIGHT: 250 LBS | HEIGHT: 63 IN | SYSTOLIC BLOOD PRESSURE: 126 MMHG | HEART RATE: 70 BPM | DIASTOLIC BLOOD PRESSURE: 72 MMHG | TEMPERATURE: 98.3 F | BODY MASS INDEX: 44.3 KG/M2

## 2019-11-25 DIAGNOSIS — N95.2 VAGINAL ATROPHY: ICD-10-CM

## 2019-11-25 DIAGNOSIS — N94.19 DYSPAREUNIA DUE TO MEDICAL CONDITION IN FEMALE: ICD-10-CM

## 2019-11-25 DIAGNOSIS — N76.0 VAGINITIS AND VULVOVAGINITIS: Primary | ICD-10-CM

## 2019-11-25 RX ORDER — MICONAZOLE NITRATE 2 %
1 CREAM WITH APPLICATOR VAGINAL
Qty: 45 G | Refills: 0 | Status: SHIPPED | OUTPATIENT
Start: 2019-11-25 | End: 2019-12-02

## 2019-11-25 RX ORDER — FLUCONAZOLE 150 MG/1
150 TABLET ORAL DAILY
Qty: 1 TAB | Refills: 0 | Status: SHIPPED | OUTPATIENT
Start: 2019-11-25 | End: 2019-11-26

## 2019-11-25 NOTE — PROGRESS NOTES
63 y/o  presents to the office for dyspareunia. She states her  has started to take Viagra 3 years ago and now sex is painful. She states it hurts mostly with insertion that feels like a scrape. She states thrusting also feels too deep. She denies bleeding. She states intercourse lasts 45 minutes until his ejaculation. She isn't able to achieve orgasm. She states prior to Viagra, it was comfortable. She attributes it to his penis being larger and firmer. She went through menopause at age 29 after hysterectomy due to fibroids. She had her ovaries removed in her 40's due to recurrent cysts. She had been on HRT for 1 year and discontinued due to weight gain. Active Ambulatory Problems     Diagnosis Date Noted    Morbid obesity ([facility] [address] 2018    Body mass index (BMI) of 40.0-44.9 in adult [facility]) 2018    Essential hypertension 2018    Type 2 diabetes mellitus without complication, without long-term current use of insulin ([facility] [address] 2018    Gastroesophageal reflux disease without esophagitis 2018    DONTE (stress urinary incontinence, female) 2018    Moderate major depression ([facility] [address] 2018    PTSD (post-traumatic stress disorder) 2018     Resolved Ambulatory Problems     Diagnosis Date Noted    No Resolved Ambulatory Problems     Past Medical History:   Diagnosis Date    Carpal tunnel syndrome     Colon polyp     Depression     Diabetes (HCC)     Fibromyalgia     Hiatal hernia     Hypertension     Obesity     Osteoarthritis (arthritis due to wear and tear of joints)     Sleep apnea     Spinal stenosis      Visit Vitals  /72   Pulse 70   Temp 98.3 °F (36.8 °C) (Oral)   Resp 18   Ht 5' 3\" (1.6 m)   Wt 250 lb (113.4 kg)   BMI 44.29 kg/m²     Gen: A&ox3, NAD  Abdomen: obese, soft, NT, ND  SVE: NEFG, cuff well healed. Vaginal mucosae is pale, white, non- rugated. BME: no palpable mass, noted enterocele- Stage 1 cystocele.  No rectocele appreciated. Large amount of white discharge. Wet prep: + thick, white discharge    A/P  Dyspareunia- vaginal atrophy. Rx for Premarin cream sent. Candidiasis- Rx for Diflucan and Monisitat sent. RTO in 2 months. Will trial Intra brandy if dyspareunia not resolved. 30 minutes spent with this patient. The plan of care has been discussed with the patient. She has been given the opportunity to ask questions, which seem to have been answered satisfactorily.

## 2019-11-25 NOTE — PATIENT INSTRUCTIONS
Atrophic Vaginitis: Care Instructions  Your Care Instructions    Atrophic vaginitis is an irritation of the vagina. It's caused by thinning tissues and less moisture in the vaginal walls. It often happens during menopause when hormone levels change. Surgery to remove the ovaries also can cause it. Your doctor may do tests to rule out other causes. And you may get tests to measure your hormone levels. The problem is most often treated with the hormone estrogen. It comes in a cream, tablets, or a soft plastic ring that is placed in the vagina. Follow-up care is a key part of your treatment and safety. Be sure to make and go to all appointments, and call your doctor if you are having problems. It's also a good idea to know your test results and keep a list of the medicines you take. How can you care for yourself at home? · Use a water-based lubricant for your vagina if sex is dry or painful. Examples are Astroglide, Wet Lubricant Gel, and K-Y Jelly. · Talk with your doctor about using low-dose vaginal estrogen. It treats dryness and thinning tissue. · Do not douche. · Having sex improves blood flow to the vagina. This helps keep your tissue healthy. When should you call for help? Watch closely for changes in your health, and be sure to contact your doctor if:    · You have unexpected vaginal bleeding.     · You do not get better as expected. Where can you learn more? Go to http://alejandro-hoa.info/. Enter R330 in the search box to learn more about \"Atrophic Vaginitis: Care Instructions. \"  Current as of: February 19, 2019  Content Version: 12.2  © 2281-9540 [employer]. Care instructions adapted under license by [employer] (which disclaims liability or warranty for this information).  If you have questions about a medical condition or this instruction, always ask your healthcare professional. [place] [address] any warranty or liability for your use of this information. Candidiasis: Care Instructions  Your Care Instructions  Candidiasis (say \"fcl-ojd-EC-uh-jenna\") is a yeast infection. Yeast normally lives in your body. But it can cause problems if your body's defenses don't work as they should. Some medicines can increase your chance of getting a yeast infection. These include antibiotics, steroids, and cancer drugs. And some diseases like AIDS and diabetes can make you more likely to get yeast infections. There are different types of yeast infections. Padmini Fuentes is a yeast infection in the mouth. It usually occurs in people with weak immune systems. It causes white patches inside the mouth and throat. Yeast infections of the skin usually occur in skin folds where the skin stays moist. They cause red, oozing patches on your skin. Babies can get these infections under the diaper. People who often wear gloves can get them on their hands. Many women get vaginal yeast infections. They are most common when women take antibiotics. These infections can cause the vagina to itch and burn. They also cause white discharge that looks like cottage cheese. In rare cases, yeast infects the blood. This can cause serious disease. This kind of infection is treated with medicine given through a needle into a vein (IV). After you start treatment, a yeast infection usually goes away quickly. But if your immune system is weak, the infection may come back. Tell your doctor if you get yeast infections often. Follow-up care is a key part of your treatment and safety. Be sure to make and go to all appointments, and call your doctor if you are having problems. It's also a good idea to know your test results and keep a list of the medicines you take. How can you care for yourself at home? · Take your medicines exactly as prescribed. Call your doctor if you think you are having a problem with your medicine. · Use antibiotics only as directed by your doctor.   · Eat yogurt with live cultures. It has bacteria called lactobacillus. It may help prevent some types of yeast infections. · Keep your skin clean and dry. Put powder on moist places. · If you are using a cream or suppository to treat a vaginal yeast infection, don't use condoms or a diaphragm. Use a different type of birth control. · Eat a healthy diet and get regular exercise. This will help keep your immune system strong. When should you call for help? Watch closely for changes in your health, and be sure to contact your doctor if:    · You do not get better as expected. Where can you learn more? Go to http://alejandro-hoa.info/. Enter Z764 in the search box to learn more about \"Candidiasis: Care Instructions. \"  Current as of: February 19, 2019  Content Version: 12.2  © 2582-2828 [employer]. Care instructions adapted under license by [employer] (which disclaims liability or warranty for this information). If you have questions about a medical condition or this instruction, always ask your healthcare professional. [address] any warranty or liability for your use of this information.

## 2021-12-23 ENCOUNTER — APPOINTMENT (OUTPATIENT)
Dept: GENERAL RADIOLOGY | Age: 66
End: 2021-12-23
Attending: STUDENT IN AN ORGANIZED HEALTH CARE EDUCATION/TRAINING PROGRAM
Payer: MEDICARE

## 2021-12-23 PROCEDURE — 93005 ELECTROCARDIOGRAM TRACING: CPT

## 2021-12-23 PROCEDURE — 99283 EMERGENCY DEPT VISIT LOW MDM: CPT

## 2021-12-23 PROCEDURE — 71046 X-RAY EXAM CHEST 2 VIEWS: CPT

## 2021-12-24 ENCOUNTER — HOSPITAL ENCOUNTER (EMERGENCY)
Age: 66
Discharge: HOME | End: 2021-12-24
Attending: STUDENT IN AN ORGANIZED HEALTH CARE EDUCATION/TRAINING PROGRAM
Payer: MEDICARE

## 2021-12-24 VITALS
SYSTOLIC BLOOD PRESSURE: 127 MMHG | OXYGEN SATURATION: 99 % | RESPIRATION RATE: 20 BRPM | HEART RATE: 89 BPM | TEMPERATURE: 97.9 F | DIASTOLIC BLOOD PRESSURE: 72 MMHG

## 2021-12-24 DIAGNOSIS — R07.9 CHEST PAIN, UNSPECIFIED TYPE: Primary | ICD-10-CM

## 2021-12-24 LAB
ALBUMIN SERPL-MCNC: 3.7 G/DL (ref 3.4–5)
ALBUMIN/GLOB SERPL: 1.1 {RATIO} (ref 0.8–1.7)
ALP SERPL-CCNC: 170 U/L (ref 45–117)
ALT SERPL-CCNC: 33 U/L (ref 13–56)
ANION GAP SERPL CALC-SCNC: 8 MMOL/L (ref 3–18)
AST SERPL-CCNC: 18 U/L (ref 10–38)
ATRIAL RATE: 101 BPM
BASOPHILS # BLD: 0.1 K/UL (ref 0–0.1)
BASOPHILS NFR BLD: 1 % (ref 0–2)
BILIRUB SERPL-MCNC: 0.5 MG/DL (ref 0.2–1)
BNP SERPL-MCNC: 65 PG/ML (ref 0–900)
BUN SERPL-MCNC: 17 MG/DL (ref 7–18)
BUN/CREAT SERPL: 23 (ref 12–20)
CALCIUM SERPL-MCNC: 9.2 MG/DL (ref 8.5–10.1)
CALCULATED P AXIS, ECG09: 49 DEGREES
CALCULATED R AXIS, ECG10: -8 DEGREES
CALCULATED T AXIS, ECG11: 19 DEGREES
CHLORIDE SERPL-SCNC: 101 MMOL/L (ref 100–111)
CO2 SERPL-SCNC: 29 MMOL/L (ref 21–32)
CREAT SERPL-MCNC: 0.74 MG/DL (ref 0.6–1.3)
DIAGNOSIS, 93000: NORMAL
DIFFERENTIAL METHOD BLD: NORMAL
EOSINOPHIL # BLD: 0.3 K/UL (ref 0–0.4)
EOSINOPHIL NFR BLD: 3 % (ref 0–5)
ERYTHROCYTE [DISTWIDTH] IN BLOOD BY AUTOMATED COUNT: 13.5 % (ref 11.6–14.5)
GLOBULIN SER CALC-MCNC: 3.4 G/DL (ref 2–4)
GLUCOSE SERPL-MCNC: 324 MG/DL (ref 74–99)
HCT VFR BLD AUTO: 37.4 % (ref 35–45)
HGB BLD-MCNC: 12.4 G/DL (ref 12–16)
IMM GRANULOCYTES # BLD AUTO: 0 K/UL (ref 0–0.04)
IMM GRANULOCYTES NFR BLD AUTO: 0 % (ref 0–0.5)
LYMPHOCYTES # BLD: 3.5 K/UL (ref 0.9–3.6)
LYMPHOCYTES NFR BLD: 33 % (ref 21–52)
MAGNESIUM SERPL-MCNC: 1.8 MG/DL (ref 1.6–2.6)
MCH RBC QN AUTO: 29.5 PG (ref 24–34)
MCHC RBC AUTO-ENTMCNC: 33.2 G/DL (ref 31–37)
MCV RBC AUTO: 88.8 FL (ref 78–100)
MONOCYTES # BLD: 0.7 K/UL (ref 0.05–1.2)
MONOCYTES NFR BLD: 7 % (ref 3–10)
NEUTS SEG # BLD: 6 K/UL (ref 1.8–8)
NEUTS SEG NFR BLD: 56 % (ref 40–73)
NRBC # BLD: 0 K/UL (ref 0–0.01)
NRBC BLD-RTO: 0 PER 100 WBC
P-R INTERVAL, ECG05: 154 MS
PLATELET # BLD AUTO: 292 K/UL (ref 135–420)
PMV BLD AUTO: 10.6 FL (ref 9.2–11.8)
POTASSIUM SERPL-SCNC: 3.4 MMOL/L (ref 3.5–5.5)
PROT SERPL-MCNC: 7.1 G/DL (ref 6.4–8.2)
Q-T INTERVAL, ECG07: 350 MS
QRS DURATION, ECG06: 74 MS
QTC CALCULATION (BEZET), ECG08: 453 MS
RBC # BLD AUTO: 4.21 M/UL (ref 4.2–5.3)
SODIUM SERPL-SCNC: 138 MMOL/L (ref 136–145)
TROPONIN-HIGH SENSITIVITY: 20 NG/L (ref 0–54)
TROPONIN-HIGH SENSITIVITY: 21 NG/L (ref 0–54)
VENTRICULAR RATE, ECG03: 101 BPM
WBC # BLD AUTO: 10.6 K/UL (ref 4.6–13.2)

## 2021-12-24 PROCEDURE — 85025 COMPLETE CBC W/AUTO DIFF WBC: CPT

## 2021-12-24 PROCEDURE — 83735 ASSAY OF MAGNESIUM: CPT

## 2021-12-24 PROCEDURE — 83880 ASSAY OF NATRIURETIC PEPTIDE: CPT

## 2021-12-24 PROCEDURE — 84484 ASSAY OF TROPONIN QUANT: CPT

## 2021-12-24 PROCEDURE — 80053 COMPREHEN METABOLIC PANEL: CPT

## 2021-12-24 NOTE — ED PROVIDER NOTES
EMERGENCY DEPARTMENT HISTORY AND PHYSICAL EXAM      Date: 12/24/2021  Patient Name: Janie Lancaster    History of Presenting Illness     Chief Complaint   Patient presents with    Chest Pain    Dizziness    Nausea       History Provided By: Patient    HPI: Miriam Muller, 73 y.o. female PMHx significant for DM, spinal stenosis, carpal tunnel, depression, PTSD, sleep apnea, fibromyalgia, htn, presents ambulatory to the ED with cc of intermittent aching midsternal chest pain that radiates to left arm x1 week. Pt reports sx typically occur at rest and are not exacerbated by activity or movement. Patient reports intermittent nausea. Patient also reports intermittent dizziness. Patient denies change in dizziness with head movement or going from sitting to standing. Patient reports dizziness last about 45 minutes. Hx vertigo. Pt reports hx CAD. Patient states she has not recently followed up with cardiologist.    There are no other complaints, changes, or physical findings at this time. PCP: Rodolfo Turner MD    No current facility-administered medications on file prior to encounter. Current Outpatient Medications on File Prior to Encounter   Medication Sig Dispense Refill    conjugated estrogens (PREMARIN) 0.625 mg/gram vaginal cream Insert 0.5 g into vagina daily. 30 g 2    DULoxetine (CYMBALTA) 60 mg capsule TAKE 1 CAPSULE BY MOUTH  DAILY 90 Cap 0    metoprolol tartrate (LOPRESSOR) 50 mg tablet TAKE 1 TABLET BY MOUTH TWO  TIMES DAILY 180 Tab 0    SITagliptin (JANUVIA) 100 mg tablet TAKE 1 TABLET BY MOUTH  NIGHTLY 90 Tab 0    atorvastatin (LIPITOR) 40 mg tablet TAKE 1 TABLET BY MOUTH  NIGHTLY 90 Tab 0    ARIPiprazole (ABILIFY) 5 mg tablet take 1 tablet by mouth once daily  0    pantoprazole (PROTONIX) 20 mg tablet Take 20 mg by mouth daily.  hydroCHLOROthiazide (HYDRODIURIL) 12.5 mg tablet Take 1 Tab by mouth daily.  90 Tab 1    budesonide-formoterol (SYMBICORT) 80-4.5 mcg/actuation HFAA Take 2 Puffs by inhalation two (2) times a day. 1 Inhaler 5       Past History     Past Medical History:  Past Medical History:   Diagnosis Date    Carpal tunnel syndrome     Colon polyp     Depression     Diabetes (HCC)     Fibromyalgia     Hiatal hernia     Hypertension     Obesity     Osteoarthritis (arthritis due to wear and tear of joints)     PTSD (post-traumatic stress disorder)     Sleep apnea     Spinal stenosis        Past Surgical History:  Past Surgical History:   Procedure Laterality Date    COLONOSCOPY N/A 2/21/2017    COLONOSCOPY performed by Zoey Gustafson MD at [address] [place] [address] COLONOSCOPY N/A 7/26/2018    COLONOSCOPY performed by Alexsander Nichols MD at [facility] ENDOSCOPY    HX ABDOMINAL LAPAROSCOPY      CYST/tumors    HX APPENDECTOMY  1997    HX [address]    total    HX SALPINGO-OOPHORECTOMY  1997    BILATERAL       Family History:  Family History   Problem Relation Age of Onset    Ovarian Cancer Mother 64    Ovarian Cancer Maternal Grandmother     Cancer Father         Prostate Ca       Social History:  Social History     Tobacco Use    Smoking status: Never Smoker    Smokeless tobacco: Never Used   Substance Use Topics    Alcohol use: No     Alcohol/week: 0.0 standard drinks    Drug use: No       Allergies: Allergies   Allergen Reactions    Acetaminophen Shortness of Breath     Other reaction(s): unknown    Aspirin Shortness of Breath     Other reaction(s): unknown    Demerol [Meperidine] Shortness of Breath    Doxycycline Shortness of Breath and Hives    Gabapentin Unable to Obtain     Other reaction(s): unknown    Morphine Shortness of Breath    Nortriptyline Other (comments)     hallucinations    Tetracyclines Shortness of Breath         Review of Systems   Review of Systems   Constitutional: Negative for chills and fever. Respiratory: Negative for shortness of breath. Cardiovascular: Positive for chest pain.    Gastrointestinal: Positive for nausea. Negative for abdominal pain and vomiting. Genitourinary: Negative for flank pain. Musculoskeletal: Negative for back pain and myalgias. Skin: Negative for color change, pallor, rash and wound. Neurological: Positive for dizziness. Negative for weakness and light-headedness. All other systems reviewed and are negative. Physical Exam   Physical Exam  Vitals and nursing note reviewed. Constitutional:       General: She is not in acute distress. Appearance: She is well-developed. Comments: Pt in NAD   HENT:      Head: Normocephalic and atraumatic. Eyes:      Conjunctiva/sclera: Conjunctivae normal.   Cardiovascular:      Rate and Rhythm: Regular rhythm. Tachycardia present. Heart sounds: Normal heart sounds. Comments: No lower leg swelling bilaterally  Pulmonary:      Effort: Pulmonary effort is normal. No respiratory distress. Breath sounds: Normal breath sounds. Comments: Lungs CTA  Not working to breathe  Abdominal:      General: Bowel sounds are normal. There is no distension. Palpations: Abdomen is soft. Musculoskeletal:         General: Normal range of motion. Skin:     General: Skin is warm. Findings: No rash. Neurological:      Mental Status: She is alert and oriented to person, place, and time.    Psychiatric:         Behavior: Behavior normal.         Diagnostic Study Results     Labs -     Recent Results (from the past 12 hour(s))   EKG, 12 LEAD, INITIAL    Collection Time: 12/23/21 11:43 PM   Result Value Ref Range    Ventricular Rate 101 BPM    Atrial Rate 101 BPM    P-R Interval 154 ms    QRS Duration 74 ms    Q-T Interval 350 ms    QTC Calculation (Bezet) 453 ms    Calculated P Axis 49 degrees    Calculated R Axis -8 degrees    Calculated T Axis 19 degrees    Diagnosis       Sinus tachycardia  Minimal voltage criteria for LVH, may be normal variant ( R in aVL )  Borderline ECG  When compared with ECG of 26-MAR-2019 10:30,  Questionable change in QRS axis  Nonspecific T wave abnormality now evident in Lateral leads         Radiologic Studies -   XR CHEST PA LAT    (Results Pending)     CT Results  (Last 48 hours)    None        CXR Results  (Last 48 hours)    None          Medical Decision Making   I am the first provider for this patient. I reviewed the vital signs, available nursing notes, past medical history, past surgical history, family history and social history. Vital Signs-Reviewed the patient's vital signs. Patient Vitals for the past 12 hrs:   Temp Pulse BP SpO2   12/24/21 0029 99.1 °F (37.3 °C) 99 (!) 162/85 97 %         EKG interpretation: (Preliminary)  Rhythm: sinus tachycardia; and regular . Rate (approx.): 101; Axis: normal; MT interval: normal; QRS interval: normal ; ST/T wave: normal; Other findings: left ventricular hypertrophy. No acute ischemic changes. Records Reviewed: Nursing Notes, Old Medical Records, Previous electrocardiograms, Previous Radiology Studies and Previous Laboratory Studies    Provider Notes (Medical Decision Making):   DDx: ACS, Vertigo, Dehydration, Electrolyte abnormality    72 yo F who presents with intermittent left sided chest pain that radiates to left arm with assoc nausea and intermittent dizziness x 1 week. EKG shows no acute ischemic changes. ED Course:   Initial assessment performed. The patients presenting problems have been discussed, and they are in agreement with the care plan formulated and outlined with them. I have encouraged them to ask questions as they arise throughout their visit. Chart review:   9/2019: Admitted for atypical vertiginous migraine with room-spinning vertigo  3/2019: EF 56-60%. Left ventricle mild concentric hypertrophy. Age-appropriate left ventricular diastolic function. 3/2019: Negative stress test    0300: Transfer of care to Dr Figueroa Sosa, attending, at shift change. Plan: Awaiting repeat troponin.        Attestations:    Jayson Alexandre, PA    Please note that this dictation was completed with [employer], the computer voice recognition software. Quite often unanticipated grammatical, syntax, homophones, and other interpretive errors are inadvertently transcribed by the computer software. Please disregard these errors. Please excuse any errors that have escaped final proofreading. Thank you.

## 2022-03-18 PROBLEM — E11.9 TYPE 2 DIABETES MELLITUS WITHOUT COMPLICATION, WITHOUT LONG-TERM CURRENT USE OF INSULIN (HCC): Status: ACTIVE | Noted: 2018-03-09

## 2022-03-18 PROBLEM — F32.1 MODERATE MAJOR DEPRESSION (HCC): Status: ACTIVE | Noted: 2018-08-30

## 2022-03-19 PROBLEM — E66.01 MORBID OBESITY (HCC): Status: ACTIVE | Noted: 2018-03-09

## 2022-03-19 PROBLEM — K21.9 GASTROESOPHAGEAL REFLUX DISEASE WITHOUT ESOPHAGITIS: Status: ACTIVE | Noted: 2018-03-09

## 2022-03-19 PROBLEM — N39.3 SUI (STRESS URINARY INCONTINENCE, FEMALE): Status: ACTIVE | Noted: 2018-03-09

## 2022-03-19 PROBLEM — I10 ESSENTIAL HYPERTENSION: Status: ACTIVE | Noted: 2018-03-09

## 2022-03-19 PROBLEM — F43.10 PTSD (POST-TRAUMATIC STRESS DISORDER): Status: ACTIVE | Noted: 2018-08-30

## 2022-08-15 ENCOUNTER — HOSPITAL ENCOUNTER (EMERGENCY)
Age: 67
Discharge: HOME | End: 2022-08-15
Attending: EMERGENCY MEDICINE
Payer: MEDICARE

## 2022-08-15 VITALS
HEART RATE: 72 BPM | SYSTOLIC BLOOD PRESSURE: 142 MMHG | HEIGHT: 63 IN | BODY MASS INDEX: 45 KG/M2 | OXYGEN SATURATION: 96 % | WEIGHT: 254 LBS | DIASTOLIC BLOOD PRESSURE: 80 MMHG | TEMPERATURE: 98.1 F | RESPIRATION RATE: 18 BRPM

## 2022-08-15 DIAGNOSIS — G43.809 OTHER MIGRAINE WITHOUT STATUS MIGRAINOSUS, NOT INTRACTABLE: Primary | ICD-10-CM

## 2022-08-15 PROCEDURE — 96375 TX/PRO/DX INJ NEW DRUG ADDON: CPT

## 2022-08-15 PROCEDURE — 99284 EMERGENCY DEPT VISIT MOD MDM: CPT

## 2022-08-15 PROCEDURE — 74011250636 HC RX REV CODE- 250/636: Performed by: PHYSICIAN ASSISTANT

## 2022-08-15 PROCEDURE — 96374 THER/PROPH/DIAG INJ IV PUSH: CPT

## 2022-08-15 RX ORDER — MECLIZINE HCL 12.5 MG 12.5 MG/1
25 TABLET ORAL DAILY
Status: DISCONTINUED | OUTPATIENT
Start: 2022-08-16 | End: 2022-08-15

## 2022-08-15 RX ORDER — MECLIZINE HCL 12.5 MG 12.5 MG/1
25 TABLET ORAL ONCE
Status: DISCONTINUED | OUTPATIENT
Start: 2022-08-15 | End: 2022-08-15 | Stop reason: HOSPADM

## 2022-08-15 RX ORDER — ONDANSETRON 2 MG/ML
4 INJECTION INTRAMUSCULAR; INTRAVENOUS
Status: COMPLETED | OUTPATIENT
Start: 2022-08-15 | End: 2022-08-15

## 2022-08-15 RX ORDER — DIPHENHYDRAMINE HYDROCHLORIDE 50 MG/ML
25 INJECTION, SOLUTION INTRAMUSCULAR; INTRAVENOUS
Status: COMPLETED | OUTPATIENT
Start: 2022-08-15 | End: 2022-08-15

## 2022-08-15 RX ORDER — PROCHLORPERAZINE EDISYLATE 5 MG/ML
10 INJECTION INTRAMUSCULAR; INTRAVENOUS ONCE
Status: COMPLETED | OUTPATIENT
Start: 2022-08-15 | End: 2022-08-15

## 2022-08-15 RX ADMIN — SODIUM CHLORIDE 1000 ML: 9 INJECTION, SOLUTION INTRAVENOUS at 14:40

## 2022-08-15 RX ADMIN — DIPHENHYDRAMINE HYDROCHLORIDE 25 MG: 50 INJECTION, SOLUTION INTRAMUSCULAR; INTRAVENOUS at 14:40

## 2022-08-15 RX ADMIN — ONDANSETRON 4 MG: 2 INJECTION INTRAMUSCULAR; INTRAVENOUS at 14:41

## 2022-08-15 RX ADMIN — PROCHLORPERAZINE EDISYLATE 10 MG: 5 INJECTION INTRAMUSCULAR; INTRAVENOUS at 14:41

## 2022-08-15 NOTE — ED NOTES
Pt discharged. All instructions given with handout. All questions answered. All belongings taken by pt.

## 2022-08-15 NOTE — ED PROVIDER NOTES
Emergency Department     Kendal Rollins is a 78 y.o. female seen on 8/15/2022 at 1:20 PM in the [facility] EMERGENCY DEPT in room RW3/RW3. Chief Complaint   Patient presents with    Dizziness    Headache       HPI:   Kendal Rollins is a 78 y.o. female who complaints of left sided frontal headache that started last night. It was gradual in onset, similar feeling to her normal migraines she gets. She denies any numbness, weakness, changes in  vision. She does endorse dizziness, nausea, and vomiting. She states the dizziness is intermittent and worse with movement. Endorses photophobia but denies sonophobia. It is not the worse headache of her life. She has not taken any medication for her symptoms.  Her headache is consistent with her      Historian: patient    Review of Systems:  Review of Systems    CONST:  Denies: fever, chills, weakness,   ENT: Denies: sore throat, earache, nasal congestion,   CARDIO: Denies: chest pain, palpitations,    RESP: Denies: cough, shortness of breath, dyspnea on exertion,   GI: Denies: abdominal pain, nausea, vomiting, diarrhea  : Denies: dysuria, hematuria, urinary frequency,   MUSC: Denies: muscle aches, joint pain,   SKIN: Denies: hives, rash,   PSYCH: Denies: anxiety, depression,   NEURO: Denies:  confusion, change in behavior, extremity,weakness, paresthesias,     Past Medical History: Primary Care Doctor: Julio Schwartz MD     Past Medical History:   Diagnosis Date    Carpal tunnel syndrome     Colon polyp     Depression     Diabetes ([facility] [address]     Fibromyalgia     Hiatal hernia     Hypertension     Obesity     Osteoarthritis (arthritis due to wear and tear of joints)     PTSD (post-traumatic stress disorder)     Sleep apnea     Spinal stenosis      Past Surgical History:   Procedure Laterality Date    COLONOSCOPY N/A 2/21/2017    COLONOSCOPY performed by Jun Villegas MD at [address] [place] [address] Road N/A 7/26/2018    COLONOSCOPY performed by Fanta Hay MD at [facility] ENDOSCOPY    HX ABDOMINAL LAPAROSCOPY      CYST/tumors    HX APPENDECTOMY  1997    HX HYSTERECTOMY  1992    total    HX SALPINGO-OOPHORECTOMY  1997    BILATERAL     Social History     Socioeconomic History    Marital status: LEGALLY    Tobacco Use    Smoking status: Never    Smokeless tobacco: Never   Substance and Sexual Activity    Alcohol use: No     Alcohol/week: 0.0 standard drinks    Drug use: No    Sexual activity: Yes     Partners: Male     No current facility-administered medications on file prior to encounter. Current Outpatient Medications on File Prior to Encounter   Medication Sig Dispense Refill    conjugated estrogens (PREMARIN) 0.625 mg/gram vaginal cream Insert 0.5 g into vagina daily. 30 g 2    DULoxetine (CYMBALTA) 60 mg capsule TAKE 1 CAPSULE BY MOUTH  DAILY 90 Cap 0    metoprolol tartrate (LOPRESSOR) 50 mg tablet TAKE 1 TABLET BY MOUTH TWO  TIMES DAILY 180 Tab 0    SITagliptin (JANUVIA) 100 mg tablet TAKE 1 TABLET BY MOUTH  NIGHTLY 90 Tab 0    atorvastatin (LIPITOR) 40 mg tablet TAKE 1 TABLET BY MOUTH  NIGHTLY 90 Tab 0    ARIPiprazole (ABILIFY) 5 mg tablet take 1 tablet by mouth once daily  0    pantoprazole (PROTONIX) 20 mg tablet Take 20 mg by mouth daily. hydroCHLOROthiazide (HYDRODIURIL) 12.5 mg tablet Take 1 Tab by mouth daily. 90 Tab 1    budesonide-formoterol (SYMBICORT) 80-4.5 mcg/actuation HFAA Take 2 Puffs by inhalation two (2) times a day.  1 Inhaler 5      Allergies   Allergen Reactions    Acetaminophen Shortness of Breath     Other reaction(s): unknown    Aspirin Shortness of Breath     Other reaction(s): unknown    Demerol [Meperidine] Shortness of Breath    Doxycycline Shortness of Breath and Hives    Gabapentin Unable to Obtain     Other reaction(s): unknown    Morphine Shortness of Breath    Nortriptyline Other (comments)     hallucinations    Tetracyclines Shortness of Breath        Physical Exam:    Vitals:    08/15/22 1315   BP: (!) 142/80   Pulse: 72   Resp: 18   Temp: 98.1 °F (36.7 °C)   SpO2: 96%     Vital signs were reviewed. Pulse oximetry interpretation: normal  Physical Exam    Constitutional: well appearing, nontoxic,   Head: Atraumatic, normo-cephalic,   Ears/Nose/Throat: throat clear, mucous membranes moist,   Eyes: PERRL, EOMI, anicteric,   Neck: supple, FROM, no lymphadenopathy, no meningismus,   Cardiovascular: regular rate and rhythm, no murmur, 2+ radial pulses,    Respiratory: clear to auscultation with no wheezes, rales, ronchi,   Back:  FROM, no CVA tenderness,   Abdomen: soft, non-tender, no guarding/rebound, no percussion tenderness,    Musculoskeletal: no deformities, edema,   Skin: dry, no rashes,   Neurologic: alert, oriented, answers questions follows commands, cranial nerves II through XII grossly intact. Normal alternating movement. No pronator drift. Normal finger-to-nose normal heel-to-shin. Normal strength and sensation throughout  Psychiatric: Speech pattern and content normal,      _________________________________________________________  Medical Decision Making:    Differential Diagnosis for this patient includes: migraine, CVA      ED Evaluation    Labs: No results found for this or any previous visit (from the past 24 hour(s)). Labs were reviewed and interpreted by me.     Radiology studies performed:   No orders to display       Radiographs were visualized by me    Medications   meclizine (ANTIVERT) tablet 25 mg (has no administration in time range)   sodium chloride 0.9 % bolus infusion 1,000 mL (1,000 mL IntraVENous New Bag 8/15/22 1440)   ondansetron (ZOFRAN) injection 4 mg (4 mg IntraVENous Given 8/15/22 1441)   prochlorperazine (COMPAZINE) injection 10 mg (10 mg IntraVENous Given 8/15/22 1441)   diphenhydrAMINE (BENADRYL) injection 25 mg (25 mg IntraVENous Given 8/15/22 1440)     _________________________________________________________  Procedures  ======================================================  ASSESSMENT/PLAN: Well-appearing female in no acute distress stable vital signs presents emergency department with a migraine and dizziness nausea and vomiting. This is consistent with patient's history of migraines. Patient has not taken any medication for symptoms. Patient denies any trauma. Neuro exam unremarkable. Will medicate and reevaluate. 3:35 PM pt states all of her symptoms have resolved and she is ready to be discharged home at this time. Symptoms consistent with typical migraines. No evidence of meningitis, CVA, SAH. Discussed RTEDP, pt expressed understanding         _________________________________________________________    Condition:  good  Disposition:  home  Diagnosis:    1. Other migraine without status migrainosus, not intractable        Candice Brown PA-C; 8/15/2022 @1:20 PM================================          Left side frontal headache, dizziness since last night. Denies any numbness, weakness, changes in vision. Headache rapid in onset. Hx of migraines but states this is different \"this is a sick to my stomach headache\". Endorses Nausea and vomiting. Worse with light. Has not taken any meds. Not worse headache of her life.

## 2022-09-20 NOTE — ED TRIAGE NOTES
Pt endorses dizziness and headache that is making her \"sick to my stomach\".
You can access the FollowMyHealth Patient Portal offered by [facility] by registering at the following website: [email or web address]. By joining [facility]’s FollowMyHealth portal, you will also be able to view your health information using other applications (apps) compatible with our system.

## 2023-05-23 RX ORDER — DULOXETIN HYDROCHLORIDE 60 MG/1
1 CAPSULE, DELAYED RELEASE ORAL DAILY
COMMUNITY
Start: 2019-05-29

## 2023-05-23 RX ORDER — BUDESONIDE AND FORMOTEROL FUMARATE DIHYDRATE 80; 4.5 UG/1; UG/1
2 AEROSOL RESPIRATORY (INHALATION) 2 TIMES DAILY
COMMUNITY
Start: 2019-02-11

## 2023-05-23 RX ORDER — HYDROCHLOROTHIAZIDE 12.5 MG/1
12.5 TABLET ORAL DAILY
COMMUNITY
Start: 2019-02-11

## 2023-05-23 RX ORDER — ATORVASTATIN CALCIUM 40 MG/1
1 TABLET, FILM COATED ORAL NIGHTLY
COMMUNITY
Start: 2019-05-29

## 2023-05-23 RX ORDER — METOPROLOL TARTRATE 50 MG/1
1 TABLET, FILM COATED ORAL 2 TIMES DAILY
COMMUNITY
Start: 2019-05-29

## 2023-05-23 RX ORDER — PANTOPRAZOLE SODIUM 20 MG/1
20 TABLET, DELAYED RELEASE ORAL DAILY
COMMUNITY

## 2023-05-23 RX ORDER — ARIPIPRAZOLE 5 MG/1
1 TABLET ORAL DAILY
COMMUNITY
Start: 2019-02-19

## 2023-12-04 ENCOUNTER — HOSPITAL ENCOUNTER (OUTPATIENT)
Dept: WOMENS IMAGING | Facility: HOSPITAL | Age: 68
Discharge: HOME | End: 2023-12-07
Payer: MEDICARE

## 2023-12-04 VITALS — BODY MASS INDEX: 44.99 KG/M2 | WEIGHT: 254 LBS

## 2023-12-04 DIAGNOSIS — Z12.31 VISIT FOR SCREENING MAMMOGRAM: ICD-10-CM

## 2023-12-04 PROCEDURE — 77063 BREAST TOMOSYNTHESIS BI: CPT

## (undated) DEVICE — FLEX ADVANTAGE 1500CC: Brand: FLEX ADVANTAGE

## (undated) DEVICE — MEDI-VAC NON-CONDUCTIVE SUCTION TUBING: Brand: CARDINAL HEALTH

## (undated) DEVICE — DEVICE INFL 60ML 12ATM CONVENIENT LOK REL HNDL HI PRSS FLX

## (undated) DEVICE — BASIN EMESIS 500CC ROSE 250/CS 60/PLT: Brand: MEDEGEN MEDICAL PRODUCTS, LLC

## (undated) DEVICE — BITE BLK ENDOSCP AD 54FR GRN POLYETH ENDOSCP W STRP SLD

## (undated) DEVICE — KIT COLON W/ 1.1OZ LUB AND 2 END

## (undated) DEVICE — SYR 50ML SLIP TIP NSAF LF STRL --

## (undated) DEVICE — FORCEPS BX L240CM JAW DIA2.8MM L CAP W/ NDL MIC MESH TOOTH

## (undated) DEVICE — CONTAINER PREFIL FRMLN 15ML --

## (undated) DEVICE — KENDALL 500 SERIES DIAPHORETIC FOAM MONITORING ELECTRODE - TEAR DROP SHAPE ( 30/PK): Brand: KENDALL